# Patient Record
Sex: FEMALE | Race: WHITE | NOT HISPANIC OR LATINO | Employment: OTHER | URBAN - METROPOLITAN AREA
[De-identification: names, ages, dates, MRNs, and addresses within clinical notes are randomized per-mention and may not be internally consistent; named-entity substitution may affect disease eponyms.]

---

## 2024-07-31 ENCOUNTER — HOSPITAL ENCOUNTER (INPATIENT)
Facility: HOSPITAL | Age: 79
LOS: 1 days | Discharge: HOME | End: 2024-08-01
Attending: EMERGENCY MEDICINE | Admitting: STUDENT IN AN ORGANIZED HEALTH CARE EDUCATION/TRAINING PROGRAM
Payer: MEDICARE

## 2024-07-31 ENCOUNTER — APPOINTMENT (OUTPATIENT)
Dept: RADIOLOGY | Facility: HOSPITAL | Age: 79
End: 2024-07-31
Payer: MEDICARE

## 2024-07-31 ENCOUNTER — APPOINTMENT (OUTPATIENT)
Dept: CARDIOLOGY | Facility: HOSPITAL | Age: 79
End: 2024-07-31
Payer: MEDICARE

## 2024-07-31 DIAGNOSIS — R47.01 EXPRESSIVE APHASIA: Primary | ICD-10-CM

## 2024-07-31 DIAGNOSIS — G45.8 OTHER TRANSIENT CEREBRAL ISCHEMIC ATTACKS AND RELATED SYNDROMES: ICD-10-CM

## 2024-07-31 DIAGNOSIS — I67.850 CEREBRAL AUTOSOMAL DOMINANT ARTERIOPATHY WITH SUBCORTICAL INFARCTS AND LEUKOENCEPHALOPATHY: ICD-10-CM

## 2024-07-31 LAB
ALBUMIN SERPL BCP-MCNC: 4.6 G/DL (ref 3.4–5)
ALP SERPL-CCNC: 76 U/L (ref 33–136)
ALT SERPL W P-5'-P-CCNC: 9 U/L (ref 7–45)
ANION GAP SERPL CALC-SCNC: 18 MMOL/L (ref 10–20)
APTT PPP: 30 SECONDS (ref 27–38)
AST SERPL W P-5'-P-CCNC: 15 U/L (ref 9–39)
BASOPHILS # BLD AUTO: 0.04 X10*3/UL (ref 0–0.1)
BASOPHILS NFR BLD AUTO: 0.3 %
BILIRUB SERPL-MCNC: 0.6 MG/DL (ref 0–1.2)
BUN SERPL-MCNC: 16 MG/DL (ref 6–23)
CALCIUM SERPL-MCNC: 9.7 MG/DL (ref 8.6–10.3)
CARDIAC TROPONIN I PNL SERPL HS: 26 NG/L (ref 0–13)
CHLORIDE SERPL-SCNC: 102 MMOL/L (ref 98–107)
CHOLEST SERPL-MCNC: 198 MG/DL (ref 0–199)
CHOLESTEROL/HDL RATIO: 3
CO2 SERPL-SCNC: 22 MMOL/L (ref 21–32)
CREAT SERPL-MCNC: 0.96 MG/DL (ref 0.5–1.05)
EGFRCR SERPLBLD CKD-EPI 2021: 61 ML/MIN/1.73M*2
EOSINOPHIL # BLD AUTO: 0 X10*3/UL (ref 0–0.4)
EOSINOPHIL NFR BLD AUTO: 0 %
ERYTHROCYTE [DISTWIDTH] IN BLOOD BY AUTOMATED COUNT: 13.2 % (ref 11.5–14.5)
GLUCOSE BLD MANUAL STRIP-MCNC: 160 MG/DL (ref 74–99)
GLUCOSE SERPL-MCNC: 149 MG/DL (ref 74–99)
HCT VFR BLD AUTO: 42.1 % (ref 36–46)
HDLC SERPL-MCNC: 66.1 MG/DL
HGB BLD-MCNC: 14 G/DL (ref 12–16)
HOLD SPECIMEN: NORMAL
IMM GRANULOCYTES # BLD AUTO: 0.07 X10*3/UL (ref 0–0.5)
IMM GRANULOCYTES NFR BLD AUTO: 0.5 % (ref 0–0.9)
INR PPP: 1 (ref 0.9–1.1)
LDLC SERPL CALC-MCNC: 114 MG/DL
LYMPHOCYTES # BLD AUTO: 2.08 X10*3/UL (ref 0.8–3)
LYMPHOCYTES NFR BLD AUTO: 14.9 %
MAGNESIUM SERPL-MCNC: 1.49 MG/DL (ref 1.6–2.4)
MCH RBC QN AUTO: 29.4 PG (ref 26–34)
MCHC RBC AUTO-ENTMCNC: 33.3 G/DL (ref 32–36)
MCV RBC AUTO: 88 FL (ref 80–100)
MONOCYTES # BLD AUTO: 0.65 X10*3/UL (ref 0.05–0.8)
MONOCYTES NFR BLD AUTO: 4.6 %
NEUTROPHILS # BLD AUTO: 11.16 X10*3/UL (ref 1.6–5.5)
NEUTROPHILS NFR BLD AUTO: 79.7 %
NON HDL CHOLESTEROL: 132 MG/DL (ref 0–149)
NRBC BLD-RTO: 0 /100 WBCS (ref 0–0)
PLATELET # BLD AUTO: 293 X10*3/UL (ref 150–450)
POCT GLUCOSE: 160 MG/DL (ref 74–99)
POTASSIUM SERPL-SCNC: 3.6 MMOL/L (ref 3.5–5.3)
PROT SERPL-MCNC: 7.3 G/DL (ref 6.4–8.2)
PROTHROMBIN TIME: 11.3 SECONDS (ref 9.8–12.8)
RBC # BLD AUTO: 4.76 X10*6/UL (ref 4–5.2)
SARS-COV-2 RNA RESP QL NAA+PROBE: NOT DETECTED
SODIUM SERPL-SCNC: 138 MMOL/L (ref 136–145)
TRIGL SERPL-MCNC: 91 MG/DL (ref 0–149)
VLDL: 18 MG/DL (ref 0–40)
WBC # BLD AUTO: 14 X10*3/UL (ref 4.4–11.3)

## 2024-07-31 PROCEDURE — 70450 CT HEAD/BRAIN W/O DYE: CPT | Performed by: RADIOLOGY

## 2024-07-31 PROCEDURE — 70551 MRI BRAIN STEM W/O DYE: CPT

## 2024-07-31 PROCEDURE — 84484 ASSAY OF TROPONIN QUANT: CPT | Performed by: EMERGENCY MEDICINE

## 2024-07-31 PROCEDURE — 2500000001 HC RX 250 WO HCPCS SELF ADMINISTERED DRUGS (ALT 637 FOR MEDICARE OP)

## 2024-07-31 PROCEDURE — 2500000002 HC RX 250 W HCPCS SELF ADMINISTERED DRUGS (ALT 637 FOR MEDICARE OP, ALT 636 FOR OP/ED): Performed by: STUDENT IN AN ORGANIZED HEALTH CARE EDUCATION/TRAINING PROGRAM

## 2024-07-31 PROCEDURE — 70551 MRI BRAIN STEM W/O DYE: CPT | Performed by: RADIOLOGY

## 2024-07-31 PROCEDURE — 87635 SARS-COV-2 COVID-19 AMP PRB: CPT | Performed by: STUDENT IN AN ORGANIZED HEALTH CARE EDUCATION/TRAINING PROGRAM

## 2024-07-31 PROCEDURE — 36415 COLL VENOUS BLD VENIPUNCTURE: CPT | Performed by: EMERGENCY MEDICINE

## 2024-07-31 PROCEDURE — 83735 ASSAY OF MAGNESIUM: CPT

## 2024-07-31 PROCEDURE — 85610 PROTHROMBIN TIME: CPT | Performed by: EMERGENCY MEDICINE

## 2024-07-31 PROCEDURE — G0378 HOSPITAL OBSERVATION PER HR: HCPCS

## 2024-07-31 PROCEDURE — 85025 COMPLETE CBC W/AUTO DIFF WBC: CPT | Performed by: EMERGENCY MEDICINE

## 2024-07-31 PROCEDURE — 80061 LIPID PANEL: CPT

## 2024-07-31 PROCEDURE — 1200000002 HC GENERAL ROOM WITH TELEMETRY DAILY

## 2024-07-31 PROCEDURE — 82947 ASSAY GLUCOSE BLOOD QUANT: CPT

## 2024-07-31 PROCEDURE — 2500000002 HC RX 250 W HCPCS SELF ADMINISTERED DRUGS (ALT 637 FOR MEDICARE OP, ALT 636 FOR OP/ED)

## 2024-07-31 PROCEDURE — 70450 CT HEAD/BRAIN W/O DYE: CPT

## 2024-07-31 PROCEDURE — 70498 CT ANGIOGRAPHY NECK: CPT | Performed by: RADIOLOGY

## 2024-07-31 PROCEDURE — 70498 CT ANGIOGRAPHY NECK: CPT

## 2024-07-31 PROCEDURE — 2550000001 HC RX 255 CONTRASTS: Performed by: EMERGENCY MEDICINE

## 2024-07-31 PROCEDURE — 83036 HEMOGLOBIN GLYCOSYLATED A1C: CPT

## 2024-07-31 PROCEDURE — 70496 CT ANGIOGRAPHY HEAD: CPT | Performed by: RADIOLOGY

## 2024-07-31 PROCEDURE — 99291 CRITICAL CARE FIRST HOUR: CPT | Mod: 25 | Performed by: EMERGENCY MEDICINE

## 2024-07-31 PROCEDURE — 2500000004 HC RX 250 GENERAL PHARMACY W/ HCPCS (ALT 636 FOR OP/ED)

## 2024-07-31 PROCEDURE — 93005 ELECTROCARDIOGRAM TRACING: CPT

## 2024-07-31 PROCEDURE — 80053 COMPREHEN METABOLIC PANEL: CPT | Performed by: EMERGENCY MEDICINE

## 2024-07-31 PROCEDURE — 85730 THROMBOPLASTIN TIME PARTIAL: CPT | Performed by: EMERGENCY MEDICINE

## 2024-07-31 PROCEDURE — 99223 1ST HOSP IP/OBS HIGH 75: CPT | Performed by: STUDENT IN AN ORGANIZED HEALTH CARE EDUCATION/TRAINING PROGRAM

## 2024-07-31 PROCEDURE — 96365 THER/PROPH/DIAG IV INF INIT: CPT

## 2024-07-31 PROCEDURE — 96366 THER/PROPH/DIAG IV INF ADDON: CPT

## 2024-07-31 RX ORDER — ASPIRIN 81 MG/1
81 TABLET ORAL DAILY
COMMUNITY

## 2024-07-31 RX ORDER — ASPIRIN 81 MG/1
81 TABLET ORAL DAILY
Status: DISCONTINUED | OUTPATIENT
Start: 2024-07-31 | End: 2024-08-01 | Stop reason: HOSPADM

## 2024-07-31 RX ORDER — DILTIAZEM HYDROCHLORIDE EXTENDED-RELEASE TABLETS 120 MG/1
120 TABLET, EXTENDED RELEASE ORAL DAILY
COMMUNITY

## 2024-07-31 RX ORDER — CLOPIDOGREL BISULFATE 75 MG/1
75 TABLET ORAL DAILY
Status: DISCONTINUED | OUTPATIENT
Start: 2024-08-01 | End: 2024-08-01 | Stop reason: HOSPADM

## 2024-07-31 RX ORDER — POTASSIUM CHLORIDE 1.5 G/1.58G
40 POWDER, FOR SOLUTION ORAL ONCE
Status: COMPLETED | OUTPATIENT
Start: 2024-07-31 | End: 2024-07-31

## 2024-07-31 RX ORDER — HYDRALAZINE HYDROCHLORIDE 25 MG/1
25 TABLET, FILM COATED ORAL EVERY 6 HOURS PRN
Status: DISCONTINUED | OUTPATIENT
Start: 2024-08-02 | End: 2024-07-31

## 2024-07-31 RX ORDER — ROSUVASTATIN CALCIUM 10 MG/1
20 TABLET, COATED ORAL NIGHTLY
Status: DISCONTINUED | OUTPATIENT
Start: 2024-07-31 | End: 2024-08-01 | Stop reason: HOSPADM

## 2024-07-31 RX ORDER — CLOPIDOGREL BISULFATE 300 MG/1
300 TABLET, FILM COATED ORAL ONCE
Status: COMPLETED | OUTPATIENT
Start: 2024-07-31 | End: 2024-07-31

## 2024-07-31 RX ORDER — SERTRALINE HYDROCHLORIDE 25 MG/1
25 TABLET, FILM COATED ORAL DAILY
COMMUNITY

## 2024-07-31 RX ORDER — ENOXAPARIN SODIUM 100 MG/ML
40 INJECTION SUBCUTANEOUS EVERY 24 HOURS
Status: DISCONTINUED | OUTPATIENT
Start: 2024-07-31 | End: 2024-08-01 | Stop reason: HOSPADM

## 2024-07-31 RX ORDER — SERTRALINE HYDROCHLORIDE 25 MG/1
25 TABLET, FILM COATED ORAL DAILY
Status: DISCONTINUED | OUTPATIENT
Start: 2024-07-31 | End: 2024-08-01 | Stop reason: HOSPADM

## 2024-07-31 RX ORDER — PANTOPRAZOLE SODIUM 40 MG/1
40 TABLET, DELAYED RELEASE ORAL
Status: DISCONTINUED | OUTPATIENT
Start: 2024-08-01 | End: 2024-08-01 | Stop reason: HOSPADM

## 2024-07-31 RX ORDER — LABETALOL HYDROCHLORIDE 5 MG/ML
10 INJECTION, SOLUTION INTRAVENOUS EVERY 10 MIN PRN
Status: DISCONTINUED | OUTPATIENT
Start: 2024-07-31 | End: 2024-07-31

## 2024-07-31 RX ORDER — MAGNESIUM SULFATE HEPTAHYDRATE 40 MG/ML
2 INJECTION, SOLUTION INTRAVENOUS ONCE
Status: DISCONTINUED | OUTPATIENT
Start: 2024-07-31 | End: 2024-07-31

## 2024-07-31 RX ORDER — MINERAL OIL
180 ENEMA (ML) RECTAL DAILY
COMMUNITY

## 2024-07-31 RX ORDER — OMEPRAZOLE 40 MG/1
40 CAPSULE, DELAYED RELEASE ORAL DAILY
COMMUNITY

## 2024-07-31 RX ORDER — HYDRALAZINE HYDROCHLORIDE 20 MG/ML
10 INJECTION INTRAMUSCULAR; INTRAVENOUS
Status: DISCONTINUED | OUTPATIENT
Start: 2024-07-31 | End: 2024-07-31

## 2024-07-31 RX ORDER — POTASSIUM CHLORIDE 20 MEQ/1
40 TABLET, EXTENDED RELEASE ORAL ONCE
Status: COMPLETED | OUTPATIENT
Start: 2024-07-31 | End: 2024-07-31

## 2024-07-31 RX ORDER — BUSPIRONE HYDROCHLORIDE 15 MG/1
15 TABLET ORAL 2 TIMES DAILY
COMMUNITY

## 2024-07-31 RX ORDER — MAGNESIUM SULFATE HEPTAHYDRATE 40 MG/ML
4 INJECTION, SOLUTION INTRAVENOUS ONCE
Status: COMPLETED | OUTPATIENT
Start: 2024-07-31 | End: 2024-07-31

## 2024-07-31 RX ORDER — LABETALOL HYDROCHLORIDE 5 MG/ML
10 INJECTION, SOLUTION INTRAVENOUS EVERY 6 HOURS PRN
Status: DISCONTINUED | OUTPATIENT
Start: 2024-07-31 | End: 2024-08-01 | Stop reason: HOSPADM

## 2024-07-31 RX ORDER — NAPROXEN SODIUM 220 MG/1
325 TABLET, FILM COATED ORAL ONCE
Status: DISCONTINUED | OUTPATIENT
Start: 2024-07-31 | End: 2024-08-01 | Stop reason: HOSPADM

## 2024-07-31 RX ORDER — ROSUVASTATIN CALCIUM 5 MG/1
5 TABLET, COATED ORAL DAILY
COMMUNITY
End: 2024-08-01 | Stop reason: HOSPADM

## 2024-07-31 RX ORDER — LORAZEPAM 0.5 MG/1
0.5 TABLET ORAL DAILY PRN
COMMUNITY

## 2024-07-31 RX ORDER — LORAZEPAM 0.5 MG/1
0.25 TABLET ORAL DAILY PRN
Status: DISCONTINUED | OUTPATIENT
Start: 2024-07-31 | End: 2024-08-01 | Stop reason: HOSPADM

## 2024-07-31 RX ORDER — ACETAMINOPHEN 325 MG/1
650 TABLET ORAL EVERY 4 HOURS PRN
Status: DISCONTINUED | OUTPATIENT
Start: 2024-07-31 | End: 2024-08-01 | Stop reason: HOSPADM

## 2024-07-31 RX ORDER — BUTALBITAL, ACETAMINOPHEN AND CAFFEINE 50; 325; 40 MG/1; MG/1; MG/1
1 TABLET ORAL EVERY 4 HOURS PRN
COMMUNITY

## 2024-07-31 RX ORDER — NAPROXEN SODIUM 220 MG/1
325 TABLET, FILM COATED ORAL DAILY
Status: DISCONTINUED | OUTPATIENT
Start: 2024-07-31 | End: 2024-07-31

## 2024-07-31 RX ORDER — CETIRIZINE HYDROCHLORIDE 10 MG/1
10 TABLET ORAL DAILY
Status: DISCONTINUED | OUTPATIENT
Start: 2024-07-31 | End: 2024-08-01 | Stop reason: HOSPADM

## 2024-07-31 SDOH — SOCIAL STABILITY: SOCIAL INSECURITY: DOES ANYONE TRY TO KEEP YOU FROM HAVING/CONTACTING OTHER FRIENDS OR DOING THINGS OUTSIDE YOUR HOME?: NO

## 2024-07-31 SDOH — SOCIAL STABILITY: SOCIAL INSECURITY: HAVE YOU HAD THOUGHTS OF HARMING ANYONE ELSE?: NO

## 2024-07-31 SDOH — SOCIAL STABILITY: SOCIAL INSECURITY: DO YOU FEEL UNSAFE GOING BACK TO THE PLACE WHERE YOU ARE LIVING?: NO

## 2024-07-31 SDOH — SOCIAL STABILITY: SOCIAL INSECURITY: HAVE YOU HAD ANY THOUGHTS OF HARMING ANYONE ELSE?: NO

## 2024-07-31 SDOH — SOCIAL STABILITY: SOCIAL INSECURITY: HAS ANYONE EVER THREATENED TO HURT YOUR FAMILY OR YOUR PETS?: NO

## 2024-07-31 SDOH — SOCIAL STABILITY: SOCIAL INSECURITY: DO YOU FEEL ANYONE HAS EXPLOITED OR TAKEN ADVANTAGE OF YOU FINANCIALLY OR OF YOUR PERSONAL PROPERTY?: NO

## 2024-07-31 SDOH — SOCIAL STABILITY: SOCIAL INSECURITY: ARE YOU OR HAVE YOU BEEN THREATENED OR ABUSED PHYSICALLY, EMOTIONALLY, OR SEXUALLY BY ANYONE?: NO

## 2024-07-31 SDOH — SOCIAL STABILITY: SOCIAL INSECURITY: WERE YOU ABLE TO COMPLETE ALL THE BEHAVIORAL HEALTH SCREENINGS?: YES

## 2024-07-31 SDOH — SOCIAL STABILITY: SOCIAL INSECURITY: ARE THERE ANY APPARENT SIGNS OF INJURIES/BEHAVIORS THAT COULD BE RELATED TO ABUSE/NEGLECT?: NO

## 2024-07-31 SDOH — SOCIAL STABILITY: SOCIAL INSECURITY: ABUSE: ADULT

## 2024-07-31 ASSESSMENT — COGNITIVE AND FUNCTIONAL STATUS - GENERAL
PATIENT BASELINE BEDBOUND: NO
MOBILITY SCORE: 20
MOVING TO AND FROM BED TO CHAIR: A LITTLE
STANDING UP FROM CHAIR USING ARMS: A LITTLE
STANDING UP FROM CHAIR USING ARMS: A LITTLE
DAILY ACTIVITIY SCORE: 22
WALKING IN HOSPITAL ROOM: A LITTLE
MOBILITY SCORE: 20
HELP NEEDED FOR BATHING: A LITTLE
WALKING IN HOSPITAL ROOM: A LITTLE
MOVING TO AND FROM BED TO CHAIR: A LITTLE
CLIMB 3 TO 5 STEPS WITH RAILING: A LITTLE
CLIMB 3 TO 5 STEPS WITH RAILING: A LITTLE
TOILETING: A LITTLE

## 2024-07-31 ASSESSMENT — LIFESTYLE VARIABLES
HOW OFTEN DO YOU HAVE A DRINK CONTAINING ALCOHOL: NEVER
EVER HAD A DRINK FIRST THING IN THE MORNING TO STEADY YOUR NERVES TO GET RID OF A HANGOVER: NO
EVER FELT BAD OR GUILTY ABOUT YOUR DRINKING: NO
TOTAL SCORE: 0
HAVE PEOPLE ANNOYED YOU BY CRITICIZING YOUR DRINKING: NO
HOW OFTEN DO YOU HAVE 6 OR MORE DRINKS ON ONE OCCASION: NEVER
SKIP TO QUESTIONS 9-10: 1
AUDIT-C TOTAL SCORE: 0
SUBSTANCE_ABUSE_PAST_12_MONTHS: NO
PRESCIPTION_ABUSE_PAST_12_MONTHS: NO
HOW MANY STANDARD DRINKS CONTAINING ALCOHOL DO YOU HAVE ON A TYPICAL DAY: PATIENT DOES NOT DRINK
AUDIT-C TOTAL SCORE: 0
HAVE YOU EVER FELT YOU SHOULD CUT DOWN ON YOUR DRINKING: NO

## 2024-07-31 ASSESSMENT — ACTIVITIES OF DAILY LIVING (ADL)
HEARING - LEFT EAR: FUNCTIONAL
BATHING: NEEDS ASSISTANCE
ADEQUATE_TO_COMPLETE_ADL: YES
WALKS IN HOME: NEEDS ASSISTANCE
HEARING - RIGHT EAR: FUNCTIONAL
FEEDING YOURSELF: INDEPENDENT
GROOMING: INDEPENDENT
DRESSING YOURSELF: INDEPENDENT
JUDGMENT_ADEQUATE_SAFELY_COMPLETE_DAILY_ACTIVITIES: YES
LACK_OF_TRANSPORTATION: NO
PATIENT'S MEMORY ADEQUATE TO SAFELY COMPLETE DAILY ACTIVITIES?: YES
TOILETING: NEEDS ASSISTANCE

## 2024-07-31 ASSESSMENT — COLUMBIA-SUICIDE SEVERITY RATING SCALE - C-SSRS
2. HAVE YOU ACTUALLY HAD ANY THOUGHTS OF KILLING YOURSELF?: NO
1. IN THE PAST MONTH, HAVE YOU WISHED YOU WERE DEAD OR WISHED YOU COULD GO TO SLEEP AND NOT WAKE UP?: NO
6. HAVE YOU EVER DONE ANYTHING, STARTED TO DO ANYTHING, OR PREPARED TO DO ANYTHING TO END YOUR LIFE?: NO

## 2024-07-31 ASSESSMENT — PATIENT HEALTH QUESTIONNAIRE - PHQ9
1. LITTLE INTEREST OR PLEASURE IN DOING THINGS: NOT AT ALL
2. FEELING DOWN, DEPRESSED OR HOPELESS: NOT AT ALL
SUM OF ALL RESPONSES TO PHQ9 QUESTIONS 1 & 2: 0

## 2024-07-31 ASSESSMENT — PAIN - FUNCTIONAL ASSESSMENT: PAIN_FUNCTIONAL_ASSESSMENT: 0-10

## 2024-07-31 ASSESSMENT — PAIN SCALES - GENERAL: PAINLEVEL_OUTOF10: 0 - NO PAIN

## 2024-07-31 NOTE — PROGRESS NOTES
PHARMACY STROKE RESPONSE      Patient Name: Josse Montero  MRN: [id number]  Location: [place] [address]    Patient Weight (kg):   Wt Readings from Last 1 Encounters:   07/31/24 71.5 kg (157 lb 10.1 oz)        An acute Brain Attack has been activated, pharmacy participated in multidisciplinary team bedside response for Josse Montero.  Contraindications for fibrinolytic therapy have been reviewed by pharmacy and any issues relating to medication therapy have been discussed directly with the provider(s) caring for this patient.     Pharmacy aided in the bedside response for fibrinolytic therapy. Patient did not receive fibrinolysis.         Thank you for allowing me to take part in the care of this patient.     Shannan Garcia, PharmD  7/31/2024  12:34 PM         References:    Neurological [facility] Stroke Tools   Neurological [facility] IV Thrombolysis Checklist

## 2024-07-31 NOTE — PROGRESS NOTES
Pharmacy Medication History Review    Josse Montero is a 78 y.o. female admitted for No Principal Problem: There is no principal problem currently on the Problem List. Please update the Problem List and refresh.. Pharmacy reviewed the patient's [id number] medications and allergies for accuracy.    The list below reflectives the updated PTA list. Please review each medication in order reconciliation for additional clarification and justification.  Prior to Admission medications    Medication Sig Start Date End Date Authorizing Provider   aspirin 81 mg EC tablet Take 1 tablet (81 mg) by mouth once daily.   Historical Provider, MD   busPIRone (Buspar) 15 mg tablet Take 1 tablet (15 mg) by mouth 2 times a day.   Historical Provider, MD   butalbital-acetaminophen-caff -40 mg tablet Take 1 tablet by mouth every 4 hours if needed for headaches.   Historical Provider, MD   dilTIAZem LA (Cardizem LA) 120 mg 24 hr tablet Take 1 tablet (120 mg) by mouth once daily.   Historical Provider, MD   fexofenadine (Allegra) 180 mg tablet Take 1 tablet (180 mg) by mouth once daily.   Historical Provider, MD   LORazepam (Ativan) 0.5 mg tablet Take 0.5 tablets (0.25 mg) by mouth once daily as needed for anxiety.   Historical Provider, MD   omeprazole (PriLOSEC) 40 mg DR capsule Take 1 capsule (40 mg) by mouth once daily.   Historical Provider, MD   rosuvastatin (Crestor) 5 mg tablet Take 1 tablet (5 mg) by mouth once daily.   Historical Provider, MD   sertraline (Zoloft) 25 mg tablet Take 1 tablet (25 mg) by mouth once daily.   Historical Provider, MD        The list below reflectives the updated allergy list. Please review each documented allergy for additional clarification and justification.  Allergies  Reviewed by Aubrie Light RN on 7/31/2024        Severity Reactions Comments    Celecoxib Low Itching     Doxycycline Low GI Upset     Meperidine Low Nausea/vomiting     Sulfa (sulfonamide Antibiotics) Low Hives              Below are additional concerns with the patient's PTA list.      Yuly Samuels

## 2024-07-31 NOTE — ED TRIAGE NOTES
Pt presents to ED for expressive aphasia that began last night around 2000. Pt has stuttered speech. Reports headache. Denies numbness/tingling, vision changes.

## 2024-07-31 NOTE — ED PROVIDER NOTES
History/Exam limitations: communication barrier aphasia .   Additional history was obtained from patient.    HPI:       Josse Montero is a 78 y.o. with a history of hypertension hyperlipidemia that presents to the emergency department expressive aphasia.  Patient states started last evening right around 9 PM.  She states she began to have a hard time getting her words out.  She waited until today and told her son.  She denies any changes in vision, weakness, trouble swallowing, other systemic complaints.  She has no history of stroke.  She is on no anticoagulants.       Last Known Well Time: 9P yesterday        ------------------------------------------------------------------------------------------------------------------------------------------     Physical Exam:    ED Triage Vitals   Temp Pulse Resp BP   -- -- -- --      SpO2 Temp src Heart Rate Source Patient Position   -- -- -- --      BP Location FiO2 (%)     -- --          Gen: Not in acute distress  Head/Neck: NCAT  Eyes: Anicteric sclerae, noninjected conjunctivae  Nose: No rhinorrhea  Mouth:  MMM  Heart: Regular rate and rhythm w/ no murmurs, rubs, gallops  Lungs: CTAB w/o wheezes, rales, rhonchi, no increased work of breathing  Abdomen: Soft, NT/ND  Musculoskeletal: No deformities  Extremities: No edema.  Neurologic: Please see below for NIHSS  Skin: No rashes noted  Psychological: Calm        Interval: Baseline  Time: 12:21 PM  Person Administering Scale: Anselmo Jimenez MD     1a  Level of consciousness: 0=alert; keenly responsive   1b. LOC questions:  0=Performs both tasks correctly   1c. LOC commands: 0=Performs both tasks correctly   2.  Best Gaze: 0=normal   3. Visual: 0=No visual loss   4. Facial Palsy: 0=Normal symmetric movement   5a. Motor left arm: 0=No drift, limb holds 90 (or 45) degrees for full 10 seconds   5b.  Motor right arm: 0=No drift, limb holds 90 (or 45) degrees for full 10 seconds   6a. motor left le=No drift, limb holds 90  (or 45) degrees for full 10 seconds   6b  Motor right le=No drift, limb holds 90 (or 45) degrees for full 10 seconds   7. Limb Ataxia: 0=Absent   8.  Sensory: 0=Normal; no sensory loss   9. Best Language:  2=Severe Aphasia: fragmentary expression, inference needed, cannot identify materials   10. Dysarthria: 0=Normal   11. Extinction and Inattention: 0=No abnormality     Total:   2         VAN: VAN: Negative     ------------------------------------------------------------------------------------------------------------------------------------------     Medical Decision Making:     ED Course as of 24 1304   Wed 2024   1252 CBC demonstrates a mild leukocytosis. [MK]   1252 CT shows no acute intracranial abnormalities. [MK]   1253 CTA shows no evidence of large vessel occlusion. [MK]      ED Course User Index  [MK] Anselmo Jimenez MD         Diagnoses as of 24 1304   Expressive aphasia     Patient presents emergency department expressive aphasia.  It started at 9 PM.  She is outside the window for TNK.  Given her symptoms, I did obtain CTA even though she was Van negative.  This was negative for large artery occlusion.  At this point, patient will be admitted for further stroke workup.    EKG interpreted by myself: Sinus rhythm.  Rate of 99.  Mildly prolonged QT.  No acute ischemia.  No STEMI.     Independent Interpretation of Studies: I independently interpreted the CT head and see No obvious evidence of intracranial hemorrhage    Chronic Medical Conditions Significantly Affecting Care: Hypertension, hyperlipidemia    Social Determinants of Health Significantly Affecting Care:  None     External Records Reviewed: I reviewed recent and relevant outside records including: Outpatient medication reconciliation     Discussion of Management with Other Providers:   I discussed the patient/results with:  neurology and the admitting team      IV Thrombolysis IV Thrombolysis Checklist        IV  Thrombolysis Given: No; Thrombolysis contraindication reason: Neurologic signs have spontaneously resolved         Procedure  Critical Care    Performed by: Anselmo Jimenez MD  Authorized by: Anselmo Jimenez MD    Critical care provider statement:     Critical care time (minutes):  35    Critical care time was exclusive of:  Separately billable procedures and treating other patients and teaching time    Critical care was necessary to treat or prevent imminent or life-threatening deterioration of the following conditions:  CNS failure or compromise    Critical care was time spent personally by me on the following activities:  Ordering and performing treatments and interventions, ordering and review of laboratory studies, ordering and review of radiographic studies, re-evaluation of patient's condition, review of old charts, examination of patient, discussions with primary provider, discussions with consultants and evaluation of patient's response to treatment    Care discussed with: admitting provider              Anselmo Jimenez MD  07/31/24 [id number]

## 2024-07-31 NOTE — H&P
"  Subjective/History     Josse Montero is a 78 y.o. female with a history of hypertension, hyperlipidemia presented to the emergency department with expressive aphasia and dysarthria.  Patient states that around 9 PM last night she began to have trouble finding words and \"could not speak\".  She did not have any other deficits, no focal weakness no paresthesias no blurry vision, difficulty walking.  She says she went to bed and woke up around 3 in the morning and had 2 episodes of vomiting and went back to sleep but woke up this morning and she was still having difficulty speaking so she decided to come to the hospital.  She says the difficulty speaking is associated with what she describes as a sinus headache, but there are no other associated symptoms.  Family in the room states that she had an irregular heart rhythm in the early 2000's and says she can feel her heart fluttering in her chest a couple times a month but denies any palpitations or similar symptoms last night.  She states that the last time she felt that way was 4 or 5 months ago.  At time of examination patient states that her difficulty speaking has greatly improved and she is only having mild difficulty with occasional word finding.      ED Course: Vitals significant for heart rate of 115, blood pressure 151/93, saturating 92% on room air.  Labs significant for white blood cell count of 14, troponin mildly elevated at 26.  Head CT negative for acute pathology, CTA head and neck negative for occlusion.    Surgical history: As below  Family history: As below  Risk/Social factors: Denies tobacco use    No past medical history on file.    No past surgical history on file.    Family history:family history is not on file.    Objective     Physical Exam  Vitals reviewed.   Constitutional:       General: She is not in acute distress.     Appearance: Normal appearance.   Cardiovascular:      Rate and Rhythm: Normal rate and regular rhythm.   Pulmonary:      " Effort: Pulmonary effort is normal.      Breath sounds: Normal breath sounds.   Abdominal:      General: Abdomen is flat.      Palpations: Abdomen is soft.   Musculoskeletal:      Right lower leg: No edema.      Left lower leg: No edema.   Neurological:      General: No focal deficit present.      Mental Status: She is alert and oriented to person, place, and time.      Sensory: No sensory deficit.      Motor: No weakness.      Comments: Mild aphasia       Last Recorded Vitals  Blood pressure 134/83, pulse 96, temperature 36.2 °C (97.2 °F), temperature source Tympanic, resp. rate 20, weight 71.5 kg (157 lb 10.1 oz), SpO2 96%.  Intake/Output last 3 Shifts:  No intake/output data recorded.    Last CBC & BMP  Lab Results   Component Value Date    GLUCOSE 149 (H) 07/31/2024    CALCIUM 9.7 07/31/2024     07/31/2024    K 3.6 07/31/2024    CO2 22 07/31/2024     07/31/2024    BUN 16 07/31/2024    CREATININE 0.96 07/31/2024     Lab Results   Component Value Date    WBC 14.0 (H) 07/31/2024    HGB 14.0 07/31/2024    HCT 42.1 07/31/2024    MCV 88 07/31/2024     07/31/2024         Assessment / Plan   Josse Montero is a 78 y.o. female with a history of hypertension and hyperlipidemia who presented expressive aphasia and dysarthria.    #Stroke-like symptoms  -Patient presented with severe dysarthria  -EKG showing normal sinus rhythm with prolonged QT  -CT head 7/31 showing no acute intracranial pathology; CTA 7/31 negative for occlusion, mild narrowing of left carotid bulb at the origin of the V1 segment of right vertebral artery  -Echo with bubble study and MRI brain ordered. Increase rosuvastatin to 20 mg, will load with aspirin 325 and Plavix 300 mg followed by DAPT for 21 days then aspirin only thereafter, will need Holter monitor at discharge  -Neurology consulted    #Hypertension  -Home meds include Lopressor 25 mg twice daily, patient also states she was taking Cardizem for blood pressure  -Hold home meds  and allow permissive hypertension for the time being, as needed labetalol for SBP greater than 220 or DBP greater than 120    #HLD  -Lipid panel pending  -Rosuvastatin increased as above    DVT: Lovenox  Diet: Pending speech eval  Consults: Neurology, PT, OT, SLP  Dispo: Telemetry      Silviano Castle MD  PGY-1, Internal Medicine    This is a preliminary note, please await attending attestation for final A/P

## 2024-08-01 ENCOUNTER — APPOINTMENT (OUTPATIENT)
Dept: CARDIOLOGY | Facility: HOSPITAL | Age: 79
End: 2024-08-01
Payer: MEDICARE

## 2024-08-01 VITALS
SYSTOLIC BLOOD PRESSURE: 190 MMHG | WEIGHT: 161.82 LBS | BODY MASS INDEX: 31.77 KG/M2 | HEART RATE: 72 BPM | RESPIRATION RATE: 17 BRPM | DIASTOLIC BLOOD PRESSURE: 81 MMHG | TEMPERATURE: 99 F | OXYGEN SATURATION: 94 % | HEIGHT: 60 IN

## 2024-08-01 PROBLEM — R47.01 EXPRESSIVE APHASIA: Status: RESOLVED | Noted: 2024-07-31 | Resolved: 2024-08-01

## 2024-08-01 LAB
ALBUMIN SERPL BCP-MCNC: 3.9 G/DL (ref 3.4–5)
ANION GAP SERPL CALC-SCNC: 11 MMOL/L (ref 10–20)
AORTIC VALVE MEAN GRADIENT: 6 MMHG
AORTIC VALVE PEAK VELOCITY: 1.65 M/S
ATRIAL RATE: 99 BPM
AV PEAK GRADIENT: 10.9 MMHG
AVA (PEAK VEL): 1.48 CM2
AVA (VTI): 1.51 CM2
BODY SURFACE AREA: 1.76 M2
BUN SERPL-MCNC: 18 MG/DL (ref 6–23)
CALCIUM SERPL-MCNC: 8.8 MG/DL (ref 8.6–10.3)
CHLORIDE SERPL-SCNC: 104 MMOL/L (ref 98–107)
CO2 SERPL-SCNC: 29 MMOL/L (ref 21–32)
CREAT SERPL-MCNC: 0.88 MG/DL (ref 0.5–1.05)
EGFRCR SERPLBLD CKD-EPI 2021: 67 ML/MIN/1.73M*2
EJECTION FRACTION APICAL 4 CHAMBER: 79.4
EJECTION FRACTION: 76 %
ERYTHROCYTE [DISTWIDTH] IN BLOOD BY AUTOMATED COUNT: 13.6 % (ref 11.5–14.5)
EST. AVERAGE GLUCOSE BLD GHB EST-MCNC: 100 MG/DL
GLUCOSE BLD MANUAL STRIP-MCNC: 100 MG/DL (ref 74–99)
GLUCOSE BLD MANUAL STRIP-MCNC: 89 MG/DL (ref 74–99)
GLUCOSE SERPL-MCNC: 81 MG/DL (ref 74–99)
HBA1C MFR BLD: 5.1 %
HCT VFR BLD AUTO: 38.4 % (ref 36–46)
HGB BLD-MCNC: 12.2 G/DL (ref 12–16)
LEFT ATRIUM VOLUME AREA LENGTH INDEX BSA: 27.3 ML/M2
LEFT VENTRICULAR OUTFLOW TRACT DIAMETER: 2 CM
MAGNESIUM SERPL-MCNC: 2.39 MG/DL (ref 1.6–2.4)
MCH RBC QN AUTO: 28.7 PG (ref 26–34)
MCHC RBC AUTO-ENTMCNC: 31.8 G/DL (ref 32–36)
MCV RBC AUTO: 90 FL (ref 80–100)
MITRAL VALVE E/A RATIO: 0.8
NRBC BLD-RTO: 0 /100 WBCS (ref 0–0)
P AXIS: 47 DEGREES
PHOSPHATE SERPL-MCNC: 3.5 MG/DL (ref 2.5–4.9)
PLATELET # BLD AUTO: 234 X10*3/UL (ref 150–450)
POTASSIUM SERPL-SCNC: 4.1 MMOL/L (ref 3.5–5.3)
PR INTERVAL: 203 MS
Q ONSET: 251 MS
QRS COUNT: 16 BEATS
QRS DURATION: 102 MS
QT INTERVAL: 404 MS
QTC CALCULATION(BAZETT): 519 MS
QTC FREDERICIA: 477 MS
R AXIS: -1 DEGREES
RBC # BLD AUTO: 4.25 X10*6/UL (ref 4–5.2)
RIGHT VENTRICLE FREE WALL PEAK S': 13.4 CM/S
SODIUM SERPL-SCNC: 140 MMOL/L (ref 136–145)
T AXIS: 25 DEGREES
T OFFSET: 453 MS
VENTRICULAR RATE: 99 BPM
WBC # BLD AUTO: 10.4 X10*3/UL (ref 4.4–11.3)

## 2024-08-01 PROCEDURE — 94760 N-INVAS EAR/PLS OXIMETRY 1: CPT

## 2024-08-01 PROCEDURE — 36415 COLL VENOUS BLD VENIPUNCTURE: CPT

## 2024-08-01 PROCEDURE — 93306 TTE W/DOPPLER COMPLETE: CPT | Performed by: INTERNAL MEDICINE

## 2024-08-01 PROCEDURE — 2500000001 HC RX 250 WO HCPCS SELF ADMINISTERED DRUGS (ALT 637 FOR MEDICARE OP)

## 2024-08-01 PROCEDURE — 99222 1ST HOSP IP/OBS MODERATE 55: CPT | Performed by: STUDENT IN AN ORGANIZED HEALTH CARE EDUCATION/TRAINING PROGRAM

## 2024-08-01 PROCEDURE — 97165 OT EVAL LOW COMPLEX 30 MIN: CPT | Mod: GO

## 2024-08-01 PROCEDURE — G0378 HOSPITAL OBSERVATION PER HR: HCPCS

## 2024-08-01 PROCEDURE — 82947 ASSAY GLUCOSE BLOOD QUANT: CPT

## 2024-08-01 PROCEDURE — 85027 COMPLETE CBC AUTOMATED: CPT

## 2024-08-01 PROCEDURE — 93270 REMOTE 30 DAY ECG REV/REPORT: CPT

## 2024-08-01 PROCEDURE — 99239 HOSP IP/OBS DSCHRG MGMT >30: CPT | Performed by: STUDENT IN AN ORGANIZED HEALTH CARE EDUCATION/TRAINING PROGRAM

## 2024-08-01 PROCEDURE — 2500000001 HC RX 250 WO HCPCS SELF ADMINISTERED DRUGS (ALT 637 FOR MEDICARE OP): Performed by: STUDENT IN AN ORGANIZED HEALTH CARE EDUCATION/TRAINING PROGRAM

## 2024-08-01 PROCEDURE — 83735 ASSAY OF MAGNESIUM: CPT

## 2024-08-01 PROCEDURE — 93306 TTE W/DOPPLER COMPLETE: CPT

## 2024-08-01 PROCEDURE — 80069 RENAL FUNCTION PANEL: CPT

## 2024-08-01 PROCEDURE — 2500000002 HC RX 250 W HCPCS SELF ADMINISTERED DRUGS (ALT 637 FOR MEDICARE OP, ALT 636 FOR OP/ED)

## 2024-08-01 RX ORDER — ROSUVASTATIN CALCIUM 20 MG/1
20 TABLET, COATED ORAL NIGHTLY
Qty: 30 TABLET | Refills: 1 | Status: SHIPPED | OUTPATIENT
Start: 2024-08-01 | End: 2024-09-30

## 2024-08-01 RX ORDER — CLOPIDOGREL BISULFATE 75 MG/1
75 TABLET ORAL DAILY
Qty: 19 TABLET | Refills: 0 | Status: SHIPPED | OUTPATIENT
Start: 2024-08-02 | End: 2024-08-21

## 2024-08-01 ASSESSMENT — COGNITIVE AND FUNCTIONAL STATUS - GENERAL
MOBILITY SCORE: 23
DAILY ACTIVITIY SCORE: 24
CLIMB 3 TO 5 STEPS WITH RAILING: A LITTLE

## 2024-08-01 ASSESSMENT — PAIN DESCRIPTION - ORIENTATION: ORIENTATION: MID

## 2024-08-01 ASSESSMENT — PAIN SCALES - WONG BAKER: WONGBAKER_NUMERICALRESPONSE: HURTS LITTLE BIT

## 2024-08-01 ASSESSMENT — PAIN DESCRIPTION - LOCATION: LOCATION: HEAD

## 2024-08-01 ASSESSMENT — PAIN SCALES - GENERAL
PAINLEVEL_OUTOF10: 0 - NO PAIN
PAINLEVEL_OUTOF10: 0 - NO PAIN
PAINLEVEL_OUTOF10: 3

## 2024-08-01 NOTE — CARE PLAN
The patient's goals for the shift include      The clinical goals for the shift include To remain hemodynamically stable and to be free of further neuro deficits.    Over the shift, the patient did not make progress toward the following goals. Barriers to progression include n/a. Recommendations to address these barriers include n/a.

## 2024-08-01 NOTE — NURSING NOTE
08/01/24 2834 Patient Navigator  I introduced myself to the patient and explained my role. Pt states she lives with her son and 2 grandchildren. She is very active with them and walks frequently. I did inform her of the recommendations of 30 minutes 3 times a week af active exercise. Pt states she eats a healthy diet. I reviewed the Stroke Folder, the handouts listed below, and answered her questions. I reviewed the following lab values and what they indicate: cholesterol, HDL, LDL, & triglycerides. She denied any other needs or questions and appreciated the information I provided. I gave her my business card & instructed her to call me as needed. I have updated the patient's RN, Aleksey, of my visit. Please see the Education tab for additional information.     Handouts;   Stroke Folder  What can I eat? American Diabetes Association  Lifestyle changes to prevent a stroke- American Stroke Association  How do I follow a healthy diet pattern? American Heart Association    Rowan PICKERING, RN  Patient Navigator  Stroke Educator  Diabetes Care &

## 2024-08-01 NOTE — PROGRESS NOTES
Occupational Therapy    Evaluation    Patient Name: Josse Montero  MRN: [id number]  Today's Date: 8/1/2024  Time Calculation  Start Time: 1118  Stop Time: 1133  Time Calculation (min): 15 min  624/624-A    Assessment  IP OT Assessment  OT Assessment: This hospitalization due to expressive aphasia. Discharge patient from OT since at baseline function in ADL's and functional transfers/mobility:  indep.  End of Session Communication: Bedside nurse  End of Session Patient Position: Bed, 2 rail up; call-light within reach    Plan:  No Skilled OT: At baseline function  OT Discharge Recommendations: No further acute OT, No OT needed after discharge  OT - OK to Discharge: Yes (to next level of care when medically cleared by physician/medical team)    Subjective   Current Problem:  1. Expressive aphasia  Transthoracic Echo (TTE) Complete    Transthoracic Echo (TTE) Complete    Holter Or Event Cardiac Monitor    Holter Or Event Cardiac Monitor    CANCELED: Transthoracic Echo (TTE) Complete    CANCELED: Transthoracic Echo (TTE) Complete      2. 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  Holter Or Event Cardiac Monitor    Holter Or Event Cardiac Monitor        General:  General  Reason for Referral: stroke  Referred By: Anselmo Jimenez MD  Past Medical History Relevant to Rehab: hypertension, hyperlipidemia  Co-Treatment: PT (co-eval)  Prior to Session Communication: Bedside nurse who confirmed that patient is medically stable to participate in this OT session  Patient Position Received: Bed, 2 rail up, Alarm off, not on at start of session  General Comment: Patient seen bedside; cooperative, motivated    Pain:  Pain Assessment  0-10 (Numeric) Pain Score: 0 - No pain    Objective   Cognition:  Overall Cognitive Status: Within Functional Limits  Orientation Level: Oriented X4     Home Living:  Type of Home: House  Lives With:  (son, daughter-in-law, 2 grandchildren:  ages 12,  14))  Home Adaptive Equipment: Cane  Home Layout: Bed/bath upstairs, Two level  Home Access: Stairs to enter without rails (3)  Bathroom Shower/Tub: Tub/shower unit  Bathroom Toilet: Standard  Bathroom Equipment: Tub transfer bench  Home Living Comments: sleeps in regular bed     Prior Function:  Level of [place]: Independent with ADLs and functional transfers (indep cleaning)  Homemaking Assistance:  (shares cooking)  Hand Dominance: Right  Prior Function Comments: drives; groceries delivered    Current ADL:  LE Dressing Assistance: Independent  Toileting Assistance with Device: Independent  ADL Comments: at baseline function for ADL's    Bed Mobility/Transfers:   Bed Mobility  Bed Mobility: Yes  Bed Mobility 1  Bed Mobility 1: Supine to sitting, Sitting to supine  Level of Assistance 1: Independent  Bed Mobility Comments 1: HOB elevated  Transfers  Transfer: Yes  Transfer 1  Transfer From 1: Sit to, Stand to  Transfer to 1: Bed  Transfer Level of Assistance 1: Independent    Ambulation/Gait Training:  Functional Mobility  Functional Mobility Performed: Yes  Functional Mobility 1  Device 1: Single point cane  Assistance 1: Independent    Sitting Balance:  Static Sitting Balance  Static Sitting-Level of Assistance: Independent    Standing Balance:  Static Standing Balance  Static Standing-Level of Assistance: Independent    Sensation:  Light Touch: No apparent deficits    Extremities: RUE   RUE : Within Functional Limits and LUE   LUE: Within Functional Limits    Outcome Measures: [facility] Daily Activity  Putting on and taking off regular lower body clothing: None  Bathing (including washing, rinsing, drying): None  Putting on and taking off regular upper body clothing: None  Toileting, which includes using toilet, bedpan or urinal: None  Taking care of personal grooming such as brushing teeth: None  Eating Meals: None  Daily Activity - Total Score: 24

## 2024-08-01 NOTE — PROGRESS NOTES
Physical Therapy    Physical Therapy Evaluation    Patient Name: Josse Montero  MRN: [id number]  Today's Date: 8/1/2024   Time Calculation  Start Time: 1117  Stop Time: 1132  Time Calculation (min): 15 min  624/624-A    Assessment/Plan   PT Assessment  End of Session Communication: Bedside nurse    Assessment Comment: pt tolerated session. Pt did not require assistance with tasks. pt does not need acute PT at this time.    End of Session Patient Position: Bed, 2 rail up  IP OR SWING BED PT PLAN  Inpatient or Swing Bed: Inpatient  PT Plan  Treatment/Interventions: Bed mobility, Transfer training, Gait training  PT Plan: PT Eval only  PT Eval Only Reason: No acute PT needs identified  PT Frequency: PT eval only  PT Discharge Recommendations: No further acute PT  PT - OK to Discharge: Yes (once cleared by medical team)    Subjective     Current Problem:  1. Expressive aphasia  Transthoracic Echo (TTE) Complete    Transthoracic Echo (TTE) Complete    Holter Or Event Cardiac Monitor    Holter Or Event Cardiac Monitor    CANCELED: Transthoracic Echo (TTE) Complete    CANCELED: Transthoracic Echo (TTE) Complete      2. 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  Holter Or Event Cardiac Monitor    Holter Or Event Cardiac Monitor        Patient Active Problem List   Diagnosis   (none) - all problems resolved or deleted       General Visit Information:  Reason for Referral: expressive aphasia  Referred By: Anselmo Jimenez MD  Past Medical History Relevant to Rehab: HTN, HLD  Co-Treatment: OT  Co-Treatment Reason: to maximize pt safety and mobility  Prior to Session Communication: Bedside nurse  Patient Position Received: Bed, 2 rail up    Home Living: lives with son, daughter-in-law, and 2 granddaughter's; 2 story home, 3 LORY, bed/bathroom upstairs with rail; standard toliet; step over tub and chair; owns SPC.    Prior Level of Function:  Prior Function Per Pt/Caregiver  Report  Level of [place]: Independent with ADLs and functional transfers, Independent with homemaking with ambulation  Hand Dominance: Right  Prior Function Comments: pt drives and groceries are delivered    Precautions: fall, aspiration     Objective     Pain: 0 - No pain    Cognition: Within Functional Limits; Orientation Level: Oriented X4    General Assessments:        Sensation Light Touch: No apparent deficits    Strength Comments: LE strength WFL; LE ROM WFL        Functional Assessments:       Bed Mobility:  (IND sup <> sit ; HOB elevated OOB)    Transfer:  (IND STS from EOB)    Ambulation/Gait Training Performed:  (amb 100ft with w/w CGA)        Outcome Measures:     [facility] Basic Mobility  Turning from your back to your side while in a flat bed without using bedrails: None  Moving from lying on your back to sitting on the side of a flat bed without using bedrails: None  Moving to and from bed to chair (including a wheelchair): None  Standing up from a chair using your arms (e.g. wheelchair or bedside chair): None  To walk in hospital room: None  Climbing 3-5 steps with railing: A little  Basic Mobility - Total Score: 23          Goals:  Encounter Problems       Encounter Problems (Active)       Pain - Adult            Education Documentation  Mobility Training, taught by Mayra Romo PT at 8/1/2024  1:34 PM.  Learner: Patient  Readiness: Acceptance  Method: Explanation  Response: Verbalizes Understanding

## 2024-08-01 NOTE — DISCHARGE SUMMARY
Discharge Diagnosis  Expressive aphasia  TIA  Hypertension  Anxiety  Depression  HLD  Issues Requiring Follow-Up    Discharge Meds     Your medication list        ASK your doctor about these medications        Instructions Last Dose Given Next Dose Due   aspirin 81 mg EC tablet           busPIRone 15 mg tablet  Commonly known as: Buspar           butalbital-acetaminophen-caff -40 mg tablet           dilTIAZem  mg 24 hr tablet  Commonly known as: Cardizem LA           fexofenadine 180 mg tablet  Commonly known as: Allegra           LORazepam 0.5 mg tablet  Commonly known as: Ativan           omeprazole 40 mg DR capsule  Commonly known as: PriLOSEC           rosuvastatin 5 mg tablet  Commonly known as: Crestor           sertraline 25 mg tablet  Commonly known as: Zoloft                  Test Results Pending At Discharge  Pending Labs       No current pending labs.          Hospital Course   70-year-old female with a history of hypertension, hyperlipidemia, anxiety/depression presented 7/31 with expressive aphasia since 9 PM the previous night.  Patient stated that she could not speak, went to sleep and woke up in the middle of the night and had 2 episodes of vomiting, woke up that morning and was having continued difficulty speaking.  No other focal deficits were noted.  In the ED her NIH stroke scale score was 3 for severe dysarthria.  Head CT was negative for acute pathology, CTA head and neck were negative for acute occlusion, brain MRI was negative for acute infarction.  The patient was loaded with aspirin and Plavix to be continued for 3 weeks and aspirin only thereafter.  Her symptoms resolved while in the emergency department and at this time patient feels completely back to normal and is speaking without difficulty. She was seen by Neurology who recommended 3 weeks of dual antiplatelet therapy, high intensity statin and outpatient follow-up in 2 months.  She was discharged with a Zio patch for  monitoring of her heart rhythm and prescriptions for 20 mg rosuvastatin and Plavix.    Pertinent Physical Exam At Time of Discharge  Physical Exam  Vitals reviewed.   Constitutional:       Appearance: Normal appearance.   Cardiovascular:      Rate and Rhythm: Normal rate and regular rhythm.   Pulmonary:      Effort: Pulmonary effort is normal.      Breath sounds: Normal breath sounds.   Abdominal:      General: Abdomen is flat.      Palpations: Abdomen is soft.   Musculoskeletal:      Right lower leg: No edema.      Left lower leg: No edema.   Skin:     General: Skin is warm and dry.   Neurological:      General: No focal deficit present.      Mental Status: She is alert and oriented to person, place, and time.      Sensory: No sensory deficit.      Motor: No weakness.       Outpatient Follow-Up  No future appointments.      Silviano Castle MD

## 2024-08-01 NOTE — DISCHARGE INSTRUCTIONS
1.  You will be discharged on aspirin and another medication called Plavix.  These are to help prevent future strokes.  Take both aspirin and Plavix for a total of 3 weeks, then stop taking Plavix and take only aspirin daily.    2.  Make sure to follow-up with your PCP within 2 weeks of discharge so they may adjust any medications, as well as neurology in 2 months.

## 2024-08-01 NOTE — CONSULTS
Inpatient consult to Neurology  Consult performed by: Nilsa Pena DO  Consult ordered by: Anselmo Jimenez MD      Reason For Consult  Expressive aphasia    History Of Present Illness  Josse Montero is a 78 y.o. female presenting to [facility] on 7/31 for expressive aphasia/dysarthria that began around 9 PM night prior.  Went to bed, woke up at 3 AM w/ 2 vomiting episodes.  Not on blood thinners.  Outside TNK window.  Endorses headaches.  Denies blurry vision, focal weakness, paresthesias, ataxia. Denies palpitations.  Patient never had stroke/similar symptoms before.  Patient uses walker to ambulate.  Lives at home with son.  On arrival .  EKG normal sinus, prolonged Qtc. CT head, CTA head/neck unremarkable aside from mild narrowing of left carotid bulb and origin of V1 segment RVA.  Upon my examination patient speaking clearly/coherently with complete symptom resolution.  She does mention having palpitations/arrhythmia in the past is unsure if it was atrial fibrillation.    Past Medical History  #HTN/HLD-Hold home Lopressor, Cardizem  #GERD-Home Protonix  #Depression/anxiety-Home sertraline, lorazepam, Buspar  #Osteoarthritis-s/p R hip arthroplasty    Surgical History-R hip arthroplasty, hysterectomy TKA, appendectomy, cholecystectomy, tonsillectomy  Social History-denies tobacco  Family History  Family History   Problem Relation Name Age of Onset    Cancer Mother     Allergies-Celecoxib, Doxycycline, Meperidine, and Sulfa (sulfonamide antibiotics)     Physical Exam  General:  Pleasant and cooperative. No apparent distress.  HEENT:  Normocephalic, atraumatic  Chest:  Clear to auscultation. Bilateral and appropriate chest rise  CV:  Regular rate and rhythm.    Abdomen: Abdomen is soft, non-tender, non-distended. BS +   Extremities:  No lower extremity edema or cyanosis.   Neurological:  Conversing and answering questions appropriately   Skin:  Warm and dry.    Last Recorded Vitals  BP (!) 199/78   Pulse 65    Temp 37.3 °C (99.1 °F)   Resp 18   Wt 73.4 kg (161 lb 13.1 oz)   SpO2 94%     Assessment/Plan     #TIA  -Complete resolution of dysarthria. CT head, CTA head/neck unremarkable aside from mild narrowing of left carotid bulb and origin of V1 segment RVA. MRI brain no infarction however 2 cm left convexity meningioma, can consider outpatient follow-up for surgical intervention if symptomatic/grossly worsening symptoms. TTE w/ bubble study pending.  , A1c normal.  Hold BP meds allow permissive HTN (<220/120).  DAPTx21 days then aspirin monotherapy.  Home rosuvastatin dose increased.  Neurochecks.  Holter monitor on discharge.  PT/OT.  Follow up with Dr. Levy in a few months.    #HTN/HLD-Hold home Lopressor, Cardizem  #GERD-Home Protonix  #Depression/anxiety-Home sertraline, lorazepam, Buspar  #Osteoarthritis-s/p R hip arthroplasty    Inpatient Checklist  Code Status: DNR  DVT prophylaxis: Lovenox  Lines/Drains/Tubes: PIV  Diet: N.p.o. pending SLP  Disposition: Telemetry    Nilsa Pena, DO  PGY-2, Internal Medicine  Please SecureChat for any further questions  This is a preliminary note, please await attending attestation for final A/P

## 2024-09-06 LAB — BODY SURFACE AREA: 1.76 M2

## 2024-10-09 ENCOUNTER — APPOINTMENT (OUTPATIENT)
Dept: NEUROLOGY | Facility: CLINIC | Age: 79
End: 2024-10-09
Payer: COMMERCIAL

## 2024-10-09 VITALS
WEIGHT: 156.4 LBS | HEIGHT: 59 IN | TEMPERATURE: 97.8 F | HEART RATE: 68 BPM | BODY MASS INDEX: 31.53 KG/M2 | SYSTOLIC BLOOD PRESSURE: 138 MMHG | DIASTOLIC BLOOD PRESSURE: 84 MMHG | RESPIRATION RATE: 18 BRPM

## 2024-10-09 DIAGNOSIS — G43.109 MIGRAINE WITH AURA AND WITHOUT STATUS MIGRAINOSUS, NOT INTRACTABLE: ICD-10-CM

## 2024-10-09 DIAGNOSIS — G45.9 TIA (TRANSIENT ISCHEMIC ATTACK): ICD-10-CM

## 2024-10-09 DIAGNOSIS — D32.9 MENINGIOMA (MULTI): Primary | ICD-10-CM

## 2024-10-09 PROCEDURE — 1157F ADVNC CARE PLAN IN RCRD: CPT | Performed by: PSYCHIATRY & NEUROLOGY

## 2024-10-09 PROCEDURE — 99214 OFFICE O/P EST MOD 30 MIN: CPT | Performed by: PSYCHIATRY & NEUROLOGY

## 2024-10-09 PROCEDURE — 1160F RVW MEDS BY RX/DR IN RCRD: CPT | Performed by: PSYCHIATRY & NEUROLOGY

## 2024-10-09 PROCEDURE — 1036F TOBACCO NON-USER: CPT | Performed by: PSYCHIATRY & NEUROLOGY

## 2024-10-09 PROCEDURE — 1159F MED LIST DOCD IN RCRD: CPT | Performed by: PSYCHIATRY & NEUROLOGY

## 2024-10-09 PROCEDURE — 1126F AMNT PAIN NOTED NONE PRSNT: CPT | Performed by: PSYCHIATRY & NEUROLOGY

## 2024-10-09 RX ORDER — CLOPIDOGREL BISULFATE 75 MG/1
75 TABLET ORAL DAILY
COMMUNITY
Start: 2024-09-16

## 2024-10-09 RX ORDER — PANTOPRAZOLE SODIUM 20 MG/1
20 TABLET, DELAYED RELEASE ORAL
COMMUNITY
Start: 2024-09-05

## 2024-10-09 ASSESSMENT — PATIENT HEALTH QUESTIONNAIRE - PHQ9
2. FEELING DOWN, DEPRESSED OR HOPELESS: NOT AT ALL
SUM OF ALL RESPONSES TO PHQ9 QUESTIONS 1 AND 2: 0
1. LITTLE INTEREST OR PLEASURE IN DOING THINGS: NOT AT ALL

## 2024-10-09 ASSESSMENT — PAIN SCALES - GENERAL: PAINLEVEL: 0-NO PAIN

## 2024-10-09 NOTE — PATIENT INSTRUCTIONS
"It was a pleasure seeing you today.     STOP Aspirin 81mg daily and continue Plavix.   Trial Nurtec.   For future appointments I recommend you schedule a follow-up appointment with general neurology or headache expert.     There are good alternative medication and over-the-counter strategies to help migraines:     You can try riboflavin (vitamin B2) 200 mg twice a day as a natural migraine preventive. This may take several weeks to become fully effective.  You can try magnesium 400 mg once daily as a natural migraine preventive. If it causes loose bowel movements, reduce the dose to 200 mg.    - Melatonin 3-5 mg take 2 hours before bedtime can be helpful for difficulty with sleeping and for headaches, especially cluster headaches but also migraines.  - Avoid triggers that can cause or worsen migraines (food, lack of sleep, stress)  - Keep a diary of your headaches to note how often you get them, how long they last and any other helpful information  - Avoid taking medication for treatment of headaches (NOT preventative medication) more than 3 days per week. This includes both prescription medication and over the counter medication.  - Take your preventative medication as directed. Let me know if you have side effects or problems with the medication. Do not suddenly stop taking the medication.     Please make a follow up appointment on year.    For any urgent issues or needing to speak to a medical assistant please call [phone], option 6 during our office hours Monday-Friday 8am-4pm, and leave a voicemail with your concern.  My office will try to reach back you as soon as possible within 24 (business) hours.  If you have an emergency please call 911 or visit a local urgent care or nearest emergency room.      Please understand that [email or web address] is a useful communication tool for simple \"normal\" results or a refill request but I would not recommend using this tool for emergent or urgent issues or for conversations with " me.  I am happy to ask my staff to rearrange a follow up visit or a virtual visit sooner than requested if appropriate for your care.

## 2024-10-09 NOTE — PROGRESS NOTES
Subjective      Josse Montero is a 79 y.o. year old female is here for hospital follow-up.  Arrives with son today.    HPI  Patient is currently on Crestor as well as aspirin 81 mg daily.  I saw patient on August 1 who presented with expressive aphasia and dysarthria from the night prior.  She had a CT head and CTA head and neck done in the ER.  She does get intermittent headaches.  Her MRI brain shows a 2 cm left frontal meningioma.  Her LDL is 115.  Her Crestor was increased.  Her NIH stroke scale is 0 at time of exam.  The episode lasted a few hours.  She had a migraine that day and vomited twice daily.  Heart monitor was normal.  She is doing well.  She has had hx of migraines for a long time.  She got about 8 migraines since August.   She has no aura.   Sees Dr. Michael    Review of Systems    Patient Active Problem List   Diagnosis   (none) - all problems resolved or deleted     Past Medical History:   Diagnosis Date    Hypertension      Past Surgical History:   Procedure Laterality Date    JOINT REPLACEMENT       Social History     Tobacco Use    Smoking status: Never    Smokeless tobacco: Never   Substance Use Topics    Alcohol use: Never     family history includes Cancer in her mother.    Current Outpatient Medications:     aspirin 81 mg EC tablet, Take 1 tablet (81 mg) by mouth once daily., Disp: , Rfl:     busPIRone (Buspar) 15 mg tablet, Take 1 tablet (15 mg) by mouth 2 times a day., Disp: , Rfl:     butalbital-acetaminophen-caff -40 mg tablet, Take 1 tablet by mouth every 4 hours if needed for headaches., Disp: , Rfl:     dilTIAZem LA (Cardizem LA) 120 mg 24 hr tablet, Take 1 tablet (120 mg) by mouth once daily., Disp: , Rfl:     fexofenadine (Allegra) 180 mg tablet, Take 1 tablet (180 mg) by mouth once daily., Disp: , Rfl:     LORazepam (Ativan) 0.5 mg tablet, Take 0.5 tablets (0.25 mg) by mouth once daily as needed for anxiety., Disp: , Rfl:     omeprazole (PriLOSEC) 40 mg DR capsule, Take 1  capsule (40 mg) by mouth once daily., Disp: , Rfl:     rosuvastatin (Crestor) 20 mg tablet, Take 1 tablet (20 mg) by mouth once daily at bedtime., Disp: 30 tablet, Rfl: 1    sertraline (Zoloft) 25 mg tablet, Take 1 tablet (25 mg) by mouth once daily., Disp: , Rfl:   Allergies   Allergen Reactions    Celecoxib Itching    Doxycycline GI Upset    Meperidine Nausea/vomiting    Sulfa (Sulfonamide Antibiotics) Hives     There were no vitals taken for this visit.  Neurological Exam/Physical Exam:    Constitutional: General appearance: no acute distress. Pleasant.   Auscultation of Heart: Regular rate and rhythm, no murmurs, normal S1 and S2.   Carotid Arteries: no bruits  Peripheral Vascular Exam: No swelling, edema or tenderness to palpation in extremities  Mental status: no distress, alert, interactive and cooperative. Affect is appropriate.   Orientation: oriented to person, oriented to place and oriented to time.   Memory: recent memory intact and remote memory intact.   Attention: normal attention /concentration.   Language: normal comprehension and naming.   Fund of knowledge: Patient displays adequate knowledge of current events.  Eyes: The ophthalmoscopic examination was normal.   Cranial nerve II: Visual fields full to confrontation.   Cranial nerves III, IV, and VI: Pupils round, equally reactive to light; EOMs intact. No nystagmus.   Cranial Nerve V: Facial sensation intact to LT bilaterally.   Cranial nerve VII: no facial droop  Cranial nerve VIII: Hearing is intact  Cranial nerves IX and X: Palate elevates symmetrically.   Cranial nerve XI: Shoulder shrug intact.   Cranial nerve XII: Tongue is midline.  Motor:  Muscle bulk was normal in both upper and lower extremities.    No atrophy.   Strength is normal.   Deep Tendon Reflexes: left biceps 2+ , right biceps 2+, left triceps 2+, right triceps 2+, left brachioradialis 2+, right brachioradialis 2+, left patella 2+, right patella 2+, left ankle jerk 2+, right  ankle jerk 2+   Plantar Reflex: Toes downgoing to plantar stimulation on the left. Toes downgoing to plantar stimulation on the right.   Sensory Exam: Normal to vibratory sensation  Coordination:  no limb dystaxia  Gait:  cautious., uses a cane.        Labs:  CBC:   Lab Results   Component Value Date    WBC 10.4 08/01/2024    HGB 12.2 08/01/2024    HCT 38.4 08/01/2024     08/01/2024     BMP:   Lab Results   Component Value Date     08/01/2024    K 4.1 08/01/2024     08/01/2024    CO2 29 08/01/2024    BUN 18 08/01/2024    CREATININE 0.88 08/01/2024    CALCIUM 8.8 08/01/2024    MG 2.39 08/01/2024    PHOS 3.5 08/01/2024     LFT:   Lab Results   Component Value Date    ALKPHOS 76 07/31/2024    BILITOT 0.6 07/31/2024    PROT 7.3 07/31/2024    ALBUMIN 3.9 08/01/2024    ALT 9 07/31/2024    AST 15 07/31/2024         Assessment/Plan   Problem List Items Addressed This Visit    None  Visit Diagnoses         Codes    Meningioma (Multi)    -  Primary D32.9    TIA (transient ischemic attack)     G45.9        Will plan to repeat MRI brain in 1 year unless new symptoms to evaluate size of meningioma.  Continue with Plavix and stop ASA 81mg and cholesterol medication.  Avoid triptans due to risk of stroke.

## 2024-10-10 DIAGNOSIS — G43.109 MIGRAINE WITH AURA AND WITHOUT STATUS MIGRAINOSUS, NOT INTRACTABLE: Primary | ICD-10-CM

## 2024-12-11 ENCOUNTER — ANCILLARY PROCEDURE (OUTPATIENT)
Dept: URGENT CARE | Age: 79
End: 2024-12-11
Payer: COMMERCIAL

## 2024-12-11 ENCOUNTER — OFFICE VISIT (OUTPATIENT)
Dept: URGENT CARE | Age: 79
End: 2024-12-11
Payer: COMMERCIAL

## 2024-12-11 VITALS
DIASTOLIC BLOOD PRESSURE: 84 MMHG | OXYGEN SATURATION: 98 % | RESPIRATION RATE: 16 BRPM | SYSTOLIC BLOOD PRESSURE: 164 MMHG | BODY MASS INDEX: 30.84 KG/M2 | HEART RATE: 104 BPM | WEIGHT: 153 LBS | HEIGHT: 59 IN | TEMPERATURE: 98.1 F

## 2024-12-11 DIAGNOSIS — R06.2 WHEEZING: Primary | ICD-10-CM

## 2024-12-11 DIAGNOSIS — K44.9 HIATUS HERNIA SYNDROME: ICD-10-CM

## 2024-12-11 DIAGNOSIS — R06.2 WHEEZING: ICD-10-CM

## 2024-12-11 DIAGNOSIS — J22 LOWER RESPIRATORY TRACT INFECTION: ICD-10-CM

## 2024-12-11 PROCEDURE — 99213 OFFICE O/P EST LOW 20 MIN: CPT

## 2024-12-11 PROCEDURE — 1159F MED LIST DOCD IN RCRD: CPT

## 2024-12-11 PROCEDURE — 94640 AIRWAY INHALATION TREATMENT: CPT

## 2024-12-11 PROCEDURE — 71046 X-RAY EXAM CHEST 2 VIEWS: CPT

## 2024-12-11 PROCEDURE — 1157F ADVNC CARE PLAN IN RCRD: CPT

## 2024-12-11 PROCEDURE — 1036F TOBACCO NON-USER: CPT

## 2024-12-11 RX ORDER — AZITHROMYCIN 250 MG/1
TABLET, FILM COATED ORAL
Qty: 6 TABLET | Refills: 0 | Status: SHIPPED | OUTPATIENT
Start: 2024-12-11

## 2024-12-11 RX ORDER — METHYLPREDNISOLONE 4 MG/1
TABLET ORAL
Qty: 21 TABLET | Refills: 0 | Status: SHIPPED | OUTPATIENT
Start: 2024-12-11 | End: 2024-12-17

## 2024-12-11 RX ORDER — IPRATROPIUM BROMIDE AND ALBUTEROL SULFATE 2.5; .5 MG/3ML; MG/3ML
3 SOLUTION RESPIRATORY (INHALATION) ONCE
Status: COMPLETED | OUTPATIENT
Start: 2024-12-11 | End: 2024-12-11

## 2024-12-11 NOTE — PROGRESS NOTES
"Subjective   History of Present Illness: Josse Montero is a 79 y.o. female. They present today with a chief complaint of Cough and Nasal Congestion (Along with green mucus x 3 weeks).        Past Medical History  Allergies as of 12/11/2024 - Reviewed 12/11/2024   Allergen Reaction Noted    Celecoxib Itching 07/31/2024    Doxycycline GI Upset 07/31/2024    Meperidine Nausea/vomiting 12/29/2009    Sulfa (sulfonamide antibiotics) Hives 12/29/2009       (Not in a hospital admission)       Past Medical History:   Diagnosis Date    Hypertension        Past Surgical History:   Procedure Laterality Date    JOINT REPLACEMENT          reports that she has never smoked. She has never used smokeless tobacco. She reports that she does not drink alcohol and does not use drugs.    Review of Systems  Review of Systems                               Objective    Vitals:    12/11/24 1223   BP: 164/84   BP Location: Left arm   Pulse: 104   Resp: 16   Temp: 36.7 °C (98.1 °F)   SpO2: 98%   Weight: 69.4 kg (153 lb)   Height: 1.499 m (4' 11\")     No LMP recorded. Patient is postmenopausal.    Physical Exam  Vitals reviewed.   HENT:      Head: Normocephalic and atraumatic.      Nose: Nose normal.      Mouth/Throat:      Mouth: Mucous membranes are moist.   Eyes:      Extraocular Movements: Extraocular movements intact.      Conjunctiva/sclera: Conjunctivae normal.      Pupils: Pupils are equal, round, and reactive to light.   Cardiovascular:      Rate and Rhythm: Normal rate and regular rhythm.   Pulmonary:      Effort: Pulmonary effort is normal.      Breath sounds: Wheezing present.   Skin:     General: Skin is warm.   Neurological:      Mental Status: She is alert and oriented to person, place, and time.   Psychiatric:         Mood and Affect: Mood normal.         Behavior: Behavior normal.             Point of Care Test & Imaging Results from this visit  No results found for this visit on 12/11/24.   XR chest 2 views    Result Date: " 12/11/2024  Interpreted By:  Schoenberger, Joseph, STUDY: XR CHEST 2 VIEWS;  12/11/2024 12:46 pm   INDICATION: Signs/Symptoms:wheezing.   ,R06.2 Wheezing   COMPARISON: 09/27/2013   ACCESSION NUMBER(S): [id number]   ORDERING CLINICIAN: RYAN JUDGE   FINDINGS:         CARDIOMEDIASTINAL SILHOUETTE: The cardiac silhouette remains normal in size. In the interval since the prior exam there is a new retrocardiac opacity with a gas fluid level which is likely a moderate size hiatus hernia.   LUNGS: No definite new focal lung opacity. Hypoventilatory exam. Elevated right hemidiaphragm unchanged from prior.   ABDOMEN: No remarkable upper abdominal findings.   BONES: No acute osseous changes.       1.  No evidence of acute cardiopulmonary process. 2. Interval development of a moderate size hiatus hernia       MACRO: None   Signed by: Joseph Schoenberger 12/11/2024 12:48 PM Dictation workstation:   [id number]     Diagnostic study results (if any) were reviewed by Ryan Judge PA-C.    Assessment/Plan   Allergies, medications, history, and pertinent labs/EKGs/Imaging reviewed by Ryan Judge PA-C.     Medical Decision Making  -         Patient is educated about their diagnoses.     -          Discussed medications benefits and adverse effects.     -          Answered all patient’s questions.     -          Patient will call 911 or go to the nearest ED if worsen symptoms .     -          Patient is agreeable to the plan of care and is deemed stable upon discharge.     -          Follow up with your primary care provider in two days.      Orders and Diagnoses  Diagnoses and all orders for this visit:  Wheezing  -     XR chest 2 views; Future  -     ipratropium-albuteroL (Duo-Neb) 0.5-2.5 mg/3 mL nebulizer solution 3 mL  -     methylPREDNISolone (Medrol Dospak) 4 mg tablets; Follow schedule on package instructions  Hiatus hernia syndrome  -     Referral to General Surgery; Future  Lower respiratory tract infection  -      azithromycin (Zithromax) 250 mg tablet; Take 2 tabs (500 mg) by mouth today, then take 1 tab daily for 4 days.      Medical Admin Record  Administrations This Visit       ipratropium-albuteroL (Duo-Neb) 0.5-2.5 mg/3 mL nebulizer solution 3 mL       Admin Date  12/11/2024 Action  Given Dose  3 mL Route  nebulization Documented By  Sandra Garrett MA                    Patient disposition: Home    Electronically signed by Ryan Judge PA-C  1:11 PM

## 2024-12-12 PROBLEM — R51.9 FRONTAL HEADACHE: Status: ACTIVE | Noted: 2024-05-14

## 2024-12-12 PROBLEM — K21.9 CHRONIC GERD: Status: ACTIVE | Noted: 2024-12-12

## 2024-12-12 PROBLEM — M76.32 ILIOTIBIAL BAND SYNDROME AFFECTING LEFT LOWER LEG: Status: ACTIVE | Noted: 2023-05-22

## 2024-12-12 PROBLEM — F32.A DEPRESSION: Status: ACTIVE | Noted: 2024-05-14

## 2024-12-12 PROBLEM — N81.9 VAGINAL VAULT PROLAPSE: Status: ACTIVE | Noted: 2024-12-12

## 2024-12-12 PROBLEM — Z98.890 HISTORY OF ARTHROPLASTY OF RIGHT HIP: Status: ACTIVE | Noted: 2024-12-12

## 2024-12-12 PROBLEM — R47.89 SPEECH DYSFLUENCY: Status: ACTIVE | Noted: 2024-09-19

## 2024-12-12 PROBLEM — R53.83 FATIGUE: Status: ACTIVE | Noted: 2024-05-14

## 2024-12-12 PROBLEM — N81.6 HERNIATION OF RECTUM INTO VAGINA: Status: ACTIVE | Noted: 2024-12-12

## 2024-12-12 PROBLEM — Z96.659 ARTIFICIAL KNEE JOINT PRESENT: Status: ACTIVE | Noted: 2024-12-12

## 2024-12-12 PROBLEM — S80.02XA CONTUSION OF LEFT KNEE: Status: ACTIVE | Noted: 2024-12-12

## 2024-12-12 PROBLEM — E11.42 TYPE 2 DIABETES MELLITUS WITH DIABETIC POLYNEUROPATHY: Status: ACTIVE | Noted: 2024-12-12

## 2024-12-12 PROBLEM — E66.811 OBESITY, CLASS I, BMI 30.0-34.9 (SEE ACTUAL BMI): Status: ACTIVE | Noted: 2024-12-12

## 2024-12-12 PROBLEM — S22.39XA RIB FRACTURE: Status: ACTIVE | Noted: 2024-12-12

## 2024-12-12 PROBLEM — G43.909 MIGRAINE: Status: ACTIVE | Noted: 2024-09-19

## 2024-12-12 PROBLEM — E78.5 DYSLIPIDEMIA: Status: ACTIVE | Noted: 2024-09-19

## 2024-12-12 PROBLEM — M16.10 PRIMARY LOCALIZED OSTEOARTHRITIS OF PELVIC REGION AND THIGH: Status: ACTIVE | Noted: 2024-12-12

## 2024-12-12 PROBLEM — G45.9 TIA (TRANSIENT ISCHEMIC ATTACK): Status: ACTIVE | Noted: 2024-09-19

## 2024-12-12 PROBLEM — R25.1 TREMOR: Status: ACTIVE | Noted: 2024-05-14

## 2024-12-12 PROBLEM — F41.9 ANXIETY: Status: ACTIVE | Noted: 2024-05-14

## 2024-12-12 PROBLEM — L30.9 DERMATITIS: Status: ACTIVE | Noted: 2024-12-12
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
## 2024-12-12 PROBLEM — Z90.710 H/O HYSTERECTOMY FOR BENIGN DISEASE: Status: ACTIVE | Noted: 2024-12-12

## 2024-12-12 PROBLEM — K44.9 HIATAL HERNIA: Status: ACTIVE | Noted: 2024-12-12

## 2024-12-12 PROBLEM — I49.9 CARDIAC ARRHYTHMIA: Status: ACTIVE | Noted: 2024-09-19

## 2024-12-12 PROBLEM — K46.9 ENTEROCELE: Status: ACTIVE | Noted: 2024-12-12

## 2024-12-12 PROBLEM — M67.441 GANGLION CYST OF TENDON SHEATH OF RIGHT HAND: Status: ACTIVE | Noted: 2024-05-14

## 2024-12-12 PROBLEM — E66.9 OBESITY: Status: ACTIVE | Noted: 2024-12-12

## 2024-12-12 PROBLEM — N39.3 URINARY, INCONTINENCE, STRESS FEMALE: Status: ACTIVE | Noted: 2024-12-12

## 2024-12-12 NOTE — PATIENT INSTRUCTIONS
You were seen today for a hiatal hernia   Please follow up with any testing / labs / referrals as instructed   If you have questions please call our office at [phone]

## 2024-12-12 NOTE — PROGRESS NOTES
GENERAL SURGERY/TRAUMA  HISTORY AND PHYSICAL/CONSULT    Date: 12/13/2024 Time: 10:53 AM    Name: Josse Montero  MRN: [id number]    This is a 79 y.o. female with a hiatal hernia found incidentally on chest xray when patient was seen in urgent care for a cough/wheezing. Patient has a history of chronic GERD for which she takes Pantoprazole daily.  Denies epigastric pain, nausea, vomiting, or regurgitation.     2 = Symptoms noticeable and bothersome but not every day     Chest Xray 12/11/24:   INDICATION:  Signs/Symptoms:wheezing.      R06.2 Wheezing      COMPARISON:  09/27/2013      ACCESSION NUMBER(S):  [id number]      ORDERING CLINICIAN:  SORAYA ALANIZ      FINDINGS:                  CARDIOMEDIASTINAL SILHOUETTE:  The cardiac silhouette remains normal in size. In the interval since  the prior exam there is a new retrocardiac opacity with a gas fluid  level which is likely a moderate size hiatus hernia.      LUNGS:  No definite new focal lung opacity. Hypoventilatory exam. Elevated  right hemidiaphragm unchanged from prior.      ABDOMEN:  No remarkable upper abdominal findings.      BONES:  No acute osseous changes.      IMPRESSION:  1.  No evidence of acute cardiopulmonary process.  2. Interval development of a moderate size hiatus hernia      PAST MEDICAL HISTORY:  Patient Active Problem List   Diagnosis    Anxiety    Artificial knee joint present    Cardiac arrhythmia    Cholecystitis, unspecified    Chronic GERD    Contusion of left knee    Depression    Dermatitis    Dyslipidemia    Enterocele    Herniation of rectum into vagina    Fall at home, initial encounter    Fatigue    Frontal headache    Ganglion cyst of tendon sheath of right hand    H/O hysterectomy for benign disease    History of arthroplasty of right hip    HLD (hyperlipidemia)    Hypertension    Hypokalemia    Iliotibial band syndrome affecting left lower leg    Migraine    Obesity    Primary localized osteoarthritis of pelvic region and thigh     Rib fracture    Speech dysfluency    TIA (transient ischemic attack)    Tremor    Type 2 diabetes mellitus with diabetic polyneuropathy    Urinary, incontinence, stress female    Vaginal vault prolapse    Obesity, Class I, BMI 30.0-34.9 (see actual BMI)    Hiatal hernia      Past Medical History:   Diagnosis Date    Hypertension         PAST SURGICAL HISTORY:  Past Surgical History:   Procedure Laterality Date    JOINT REPLACEMENT         FAMILY HISTORY:  Family History   Problem Relation Name Age of Onset    Cancer Mother          SOCIAL HISTORY:  Social History     Tobacco Use    Smoking status: Never    Smokeless tobacco: Never   Vaping Use    Vaping status: Never Used   Substance Use Topics    Alcohol use: Never    Drug use: Never       MEDICATIONS:  Prior to Admission Medications:    Current Outpatient Medications:     azithromycin (Zithromax) 250 mg tablet, Take 2 tabs (500 mg) by mouth today, then take 1 tab daily for 4 days., Disp: 6 tablet, Rfl: 0    busPIRone (Buspar) 15 mg tablet, Take 1 tablet (15 mg) by mouth 2 times a day., Disp: , Rfl:     butalbital-acetaminophen-caff -40 mg tablet, Take 1 tablet by mouth every 4 hours if needed for headaches., Disp: , Rfl:     clopidogrel (Plavix) 75 mg tablet, Take 1 tablet (75 mg) by mouth once daily., Disp: , Rfl:     dilTIAZem LA (Cardizem LA) 120 mg 24 hr tablet, Take 1 tablet (120 mg) by mouth once daily., Disp: , Rfl:     fexofenadine (Allegra) 180 mg tablet, Take 1 tablet (180 mg) by mouth once daily., Disp: , Rfl:     LORazepam (Ativan) 0.5 mg tablet, Take 0.5 tablets (0.25 mg) by mouth once daily as needed for anxiety., Disp: , Rfl:     methylPREDNISolone (Medrol Dospak) 4 mg tablets, Follow schedule on package instructions, Disp: 21 tablet, Rfl: 0    omeprazole (PriLOSEC) 40 mg DR capsule, Take 1 capsule (40 mg) by mouth once daily., Disp: , Rfl:     pantoprazole (ProtoNix) 20 mg EC tablet, Take 1 tablet (20 mg) by mouth once daily in the morning.  Take before meals., Disp: , Rfl:     rosuvastatin (Crestor) 20 mg tablet, Take 1 tablet (20 mg) by mouth once daily at bedtime., Disp: 30 tablet, Rfl: 1    sertraline (Zoloft) 25 mg tablet, Take 1 tablet (25 mg) by mouth once daily., Disp: , Rfl:     ubrogepant (Ubrelvy) 100 mg tablet tablet, Take 1 tablet (100 mg) by mouth if needed (migraines) for up to 30 doses., Disp: 10 tablet, Rfl: 11    ALLERGIES:  Allergies   Allergen Reactions    Celecoxib Itching    Doxycycline GI Upset    Meperidine Nausea/vomiting    Sulfa (Sulfonamide Antibiotics) Hives and Itching       REVIEW OF SYSTEMS:  GENERAL: Negative for malaise, significant weight loss and fever  NECK: Negative for lumps, goiter, pain and significant neck swelling  RESPIRATORY: Positive for cough, wheezing or shortness of breath.  CARDIOVASCULAR: Negative for chest pain, leg swelling or palpitations.  GI: Negative for abdominal discomfort, blood in stools or black stools or change in bowel habits, positive heartburn, reflux and occasional regurgitation  : No history of dysuria, frequency or incontinence  MUSCULOSKELETAL: Negative for joint pain or swelling, back pain or muscle pain.  SKIN: Negative for lesions, rash, and itching.  PSYCH: Negative for sleep disturbance, mood disorder and recent psychosocial stressors.  ENDOCRINE: Negative for cold or heat intolerance, polyuria, polydipsia and goiter.    PHYSICAL EXAM:  There were no vitals taken for this visit.  General appearance: No abnormality detected  Skin: Small bruises  Lungs: Mild wheezing  Heart: Regular  Abdomen: Soft, nondistended, nontender  Extremities: Warm, no gross edema    IMPRESSION:  Josse Montero is a 79 y.o. female with a hiatal hernia found incidentally on chest xray. Patient has a history of chronic GERD for which she takes Pantoprazole daily.  Denies epigastric pain, nausea, vomiting, or regurgitation.     History and chest x-ray is consistent with hiatal hernia with reflux and  regurgitation issues, recent episode of TIA.  Chronic cough for the last few weeks which may be related to aspiration or isolated pulmonary pathology.    We discussed the paraesophageal hiatal hernia, symptoms it causes and the surgical solutions.  Overall if she was in good health an ideal candidate and willing to undergo surgery I would recommend a paraesophageal hiatal hernia repair with possible fundoplication.    Although she is very pleasant and understanding however she seems to be not keen on getting any surgical intervention and procedures unless mandatory.    PLAN:    Will get CT scan of chest without contrast.  She will need an endoscopy also eventually which she seems reluctant right now.    For any invasive procedures we can wait for at least 6 months from her TIA episode and then if she is interested we can proceed with endoscopy and planning for surgical repair.    Chances of needing emergent surgery are fairly low and I educated her on coming to emergency room if she is having severe abdominal or chest pain associated with vomiting or if she is not able to keep her food down etc.    Meanwhile she will continue PPI and medical care by her team.      Walter Carlos MD  Bariatric and Minimally Invasive General Surgery

## 2024-12-13 ENCOUNTER — OFFICE VISIT (OUTPATIENT)
Dept: SURGERY | Facility: CLINIC | Age: 79
End: 2024-12-13
Payer: COMMERCIAL

## 2024-12-13 VITALS
HEIGHT: 58 IN | OXYGEN SATURATION: 96 % | WEIGHT: 151.8 LBS | BODY MASS INDEX: 31.87 KG/M2 | DIASTOLIC BLOOD PRESSURE: 86 MMHG | SYSTOLIC BLOOD PRESSURE: 146 MMHG | HEART RATE: 109 BPM

## 2024-12-13 DIAGNOSIS — K44.9 HIATUS HERNIA SYNDROME: ICD-10-CM

## 2024-12-13 DIAGNOSIS — K44.9 HIATAL HERNIA: Primary | ICD-10-CM

## 2024-12-13 DIAGNOSIS — K21.9 CHRONIC GERD: ICD-10-CM

## 2024-12-13 DIAGNOSIS — E66.811 OBESITY, CLASS I, BMI 30.0-34.9 (SEE ACTUAL BMI): ICD-10-CM

## 2024-12-13 PROCEDURE — 99203 OFFICE O/P NEW LOW 30 MIN: CPT | Performed by: SURGERY

## 2024-12-13 PROCEDURE — 1157F ADVNC CARE PLAN IN RCRD: CPT | Performed by: SURGERY

## 2024-12-13 PROCEDURE — 3079F DIAST BP 80-89 MM HG: CPT | Performed by: SURGERY

## 2024-12-13 PROCEDURE — 99213 OFFICE O/P EST LOW 20 MIN: CPT | Performed by: SURGERY

## 2024-12-13 PROCEDURE — 3077F SYST BP >= 140 MM HG: CPT | Performed by: SURGERY

## 2024-12-13 PROCEDURE — 1159F MED LIST DOCD IN RCRD: CPT | Performed by: SURGERY

## 2024-12-13 PROCEDURE — 1036F TOBACCO NON-USER: CPT | Performed by: SURGERY

## 2025-01-03 ENCOUNTER — APPOINTMENT (OUTPATIENT)
Dept: CARDIOLOGY | Facility: HOSPITAL | Age: 80
End: 2025-01-03
Payer: COMMERCIAL

## 2025-01-03 ENCOUNTER — APPOINTMENT (OUTPATIENT)
Dept: RADIOLOGY | Facility: HOSPITAL | Age: 80
End: 2025-01-03
Payer: COMMERCIAL

## 2025-01-03 ENCOUNTER — HOSPITAL ENCOUNTER (EMERGENCY)
Facility: HOSPITAL | Age: 80
Discharge: HOME | End: 2025-01-04
Attending: STUDENT IN AN ORGANIZED HEALTH CARE EDUCATION/TRAINING PROGRAM
Payer: COMMERCIAL

## 2025-01-03 VITALS
RESPIRATION RATE: 20 BRPM | BODY MASS INDEX: 30.24 KG/M2 | TEMPERATURE: 96.8 F | SYSTOLIC BLOOD PRESSURE: 171 MMHG | WEIGHT: 150 LBS | DIASTOLIC BLOOD PRESSURE: 77 MMHG | HEART RATE: 81 BPM | HEIGHT: 59 IN | OXYGEN SATURATION: 92 %

## 2025-01-03 DIAGNOSIS — R00.0 TACHYCARDIA: ICD-10-CM

## 2025-01-03 DIAGNOSIS — J20.9 SUBACUTE BRONCHITIS: Primary | ICD-10-CM

## 2025-01-03 DIAGNOSIS — R05.9 COUGH, UNSPECIFIED TYPE: ICD-10-CM

## 2025-01-03 LAB
ALBUMIN SERPL BCP-MCNC: 4.4 G/DL (ref 3.4–5)
ALP SERPL-CCNC: 91 U/L (ref 33–136)
ALT SERPL W P-5'-P-CCNC: 15 U/L (ref 7–45)
ANION GAP SERPL CALC-SCNC: 15 MMOL/L (ref 10–20)
AST SERPL W P-5'-P-CCNC: 20 U/L (ref 9–39)
BASOPHILS # BLD AUTO: 0.05 X10*3/UL (ref 0–0.1)
BASOPHILS NFR BLD AUTO: 0.4 %
BILIRUB SERPL-MCNC: 0.5 MG/DL (ref 0–1.2)
BNP SERPL-MCNC: 83 PG/ML (ref 0–99)
BUN SERPL-MCNC: 13 MG/DL (ref 6–23)
CALCIUM SERPL-MCNC: 9.3 MG/DL (ref 8.6–10.3)
CARDIAC TROPONIN I PNL SERPL HS: 18 NG/L (ref 0–13)
CARDIAC TROPONIN I PNL SERPL HS: 21 NG/L (ref 0–13)
CHLORIDE SERPL-SCNC: 104 MMOL/L (ref 98–107)
CO2 SERPL-SCNC: 27 MMOL/L (ref 21–32)
CREAT SERPL-MCNC: 0.98 MG/DL (ref 0.5–1.05)
D DIMER PPP FEU-MCNC: 683 NG/ML FEU
EGFRCR SERPLBLD CKD-EPI 2021: 59 ML/MIN/1.73M*2
EOSINOPHIL # BLD AUTO: 0.03 X10*3/UL (ref 0–0.4)
EOSINOPHIL NFR BLD AUTO: 0.3 %
ERYTHROCYTE [DISTWIDTH] IN BLOOD BY AUTOMATED COUNT: 14.4 % (ref 11.5–14.5)
FLUAV RNA RESP QL NAA+PROBE: NOT DETECTED
FLUBV RNA RESP QL NAA+PROBE: NOT DETECTED
GLUCOSE SERPL-MCNC: 167 MG/DL (ref 74–99)
HCT VFR BLD AUTO: 43.3 % (ref 36–46)
HGB BLD-MCNC: 13.8 G/DL (ref 12–16)
IMM GRANULOCYTES # BLD AUTO: 0.04 X10*3/UL (ref 0–0.5)
IMM GRANULOCYTES NFR BLD AUTO: 0.4 % (ref 0–0.9)
LYMPHOCYTES # BLD AUTO: 2.17 X10*3/UL (ref 0.8–3)
LYMPHOCYTES NFR BLD AUTO: 19.4 %
MAGNESIUM SERPL-MCNC: 1.55 MG/DL (ref 1.6–2.4)
MCH RBC QN AUTO: 28.6 PG (ref 26–34)
MCHC RBC AUTO-ENTMCNC: 31.9 G/DL (ref 32–36)
MCV RBC AUTO: 90 FL (ref 80–100)
MONOCYTES # BLD AUTO: 0.77 X10*3/UL (ref 0.05–0.8)
MONOCYTES NFR BLD AUTO: 6.9 %
NEUTROPHILS # BLD AUTO: 8.13 X10*3/UL (ref 1.6–5.5)
NEUTROPHILS NFR BLD AUTO: 72.6 %
NRBC BLD-RTO: 0 /100 WBCS (ref 0–0)
PLATELET # BLD AUTO: 235 X10*3/UL (ref 150–450)
POTASSIUM SERPL-SCNC: 3.7 MMOL/L (ref 3.5–5.3)
PROT SERPL-MCNC: 7.3 G/DL (ref 6.4–8.2)
RBC # BLD AUTO: 4.83 X10*6/UL (ref 4–5.2)
SARS-COV-2 RNA RESP QL NAA+PROBE: NOT DETECTED
SODIUM SERPL-SCNC: 142 MMOL/L (ref 136–145)
WBC # BLD AUTO: 11.2 X10*3/UL (ref 4.4–11.3)

## 2025-01-03 PROCEDURE — 84484 ASSAY OF TROPONIN QUANT: CPT | Performed by: STUDENT IN AN ORGANIZED HEALTH CARE EDUCATION/TRAINING PROGRAM

## 2025-01-03 PROCEDURE — 2550000001 HC RX 255 CONTRASTS: Performed by: STUDENT IN AN ORGANIZED HEALTH CARE EDUCATION/TRAINING PROGRAM

## 2025-01-03 PROCEDURE — 85025 COMPLETE CBC W/AUTO DIFF WBC: CPT | Performed by: NURSE PRACTITIONER

## 2025-01-03 PROCEDURE — 83880 ASSAY OF NATRIURETIC PEPTIDE: CPT | Performed by: NURSE PRACTITIONER

## 2025-01-03 PROCEDURE — 71046 X-RAY EXAM CHEST 2 VIEWS: CPT | Mod: FOREIGN READ | Performed by: RADIOLOGY

## 2025-01-03 PROCEDURE — 85379 FIBRIN DEGRADATION QUANT: CPT | Performed by: NURSE PRACTITIONER

## 2025-01-03 PROCEDURE — 83735 ASSAY OF MAGNESIUM: CPT | Performed by: NURSE PRACTITIONER

## 2025-01-03 PROCEDURE — 71275 CT ANGIOGRAPHY CHEST: CPT

## 2025-01-03 PROCEDURE — 84484 ASSAY OF TROPONIN QUANT: CPT | Performed by: NURSE PRACTITIONER

## 2025-01-03 PROCEDURE — 71275 CT ANGIOGRAPHY CHEST: CPT | Performed by: RADIOLOGY

## 2025-01-03 PROCEDURE — 93005 ELECTROCARDIOGRAM TRACING: CPT

## 2025-01-03 PROCEDURE — 99285 EMERGENCY DEPT VISIT HI MDM: CPT | Mod: 25 | Performed by: STUDENT IN AN ORGANIZED HEALTH CARE EDUCATION/TRAINING PROGRAM

## 2025-01-03 PROCEDURE — 2500000004 HC RX 250 GENERAL PHARMACY W/ HCPCS (ALT 636 FOR OP/ED)

## 2025-01-03 PROCEDURE — 80053 COMPREHEN METABOLIC PANEL: CPT | Performed by: NURSE PRACTITIONER

## 2025-01-03 PROCEDURE — 36415 COLL VENOUS BLD VENIPUNCTURE: CPT | Performed by: NURSE PRACTITIONER

## 2025-01-03 PROCEDURE — 87636 SARSCOV2 & INF A&B AMP PRB: CPT | Performed by: NURSE PRACTITIONER

## 2025-01-03 PROCEDURE — 71046 X-RAY EXAM CHEST 2 VIEWS: CPT

## 2025-01-03 RX ORDER — LANOLIN ALCOHOL/MO/W.PET/CERES
400 CREAM (GRAM) TOPICAL DAILY
Status: DISCONTINUED | OUTPATIENT
Start: 2025-01-03 | End: 2025-01-04 | Stop reason: HOSPADM

## 2025-01-03 RX ORDER — LEVOFLOXACIN 250 MG/1
250 TABLET ORAL DAILY
Qty: 10 TABLET | Refills: 0 | Status: SHIPPED | OUTPATIENT
Start: 2025-01-03 | End: 2025-01-13

## 2025-01-03 RX ADMIN — MAGNESIUM OXIDE TAB 400 MG (241.3 MG ELEMENTAL MG) 400 MG: 400 (241.3 MG) TAB at 21:49

## 2025-01-03 RX ADMIN — SODIUM CHLORIDE 1000 ML: 9 INJECTION, SOLUTION INTRAVENOUS at 21:49

## 2025-01-03 RX ADMIN — IOHEXOL 75 ML: 350 INJECTION, SOLUTION INTRAVENOUS at 22:38

## 2025-01-03 ASSESSMENT — COLUMBIA-SUICIDE SEVERITY RATING SCALE - C-SSRS
1. IN THE PAST MONTH, HAVE YOU WISHED YOU WERE DEAD OR WISHED YOU COULD GO TO SLEEP AND NOT WAKE UP?: NO
2. HAVE YOU ACTUALLY HAD ANY THOUGHTS OF KILLING YOURSELF?: NO
6. HAVE YOU EVER DONE ANYTHING, STARTED TO DO ANYTHING, OR PREPARED TO DO ANYTHING TO END YOUR LIFE?: NO

## 2025-01-03 ASSESSMENT — PAIN SCALES - GENERAL: PAINLEVEL_OUTOF10: 0 - NO PAIN

## 2025-01-03 ASSESSMENT — PAIN - FUNCTIONAL ASSESSMENT: PAIN_FUNCTIONAL_ASSESSMENT: 0-10

## 2025-01-03 NOTE — ED TRIAGE NOTES
PT.  HAS BEEN HAVING COUGH FOR ABOUT A MONTH, STATES COUGHING UP STICKY, YELLOW PHLEGM. DENIES FEVERS. PT.  WAS TOLD THIS MORNING THAT HER HEART WAS BEATING TOO FAST.

## 2025-01-03 NOTE — ED TRIAGE NOTES
Secondary to patient volumes and overcrowding, I performed a brief medical screening exam of the patient in triage, as the patient awaits space in the main ED.    History of Present Illness:  Josse Montero presents with   Chief Complaint   Patient presents with    Cough     PT.  HAS BEEN HAVING COUGH FOR ABOUT A MONTH, STATES COUGHING UP STICKY, YELLOW PHLEGM. DENIES FEVERS. PT.  WAS TOLD THIS MORNING THAT HER HEART WAS BEATING TOO FAST.        Physical Exam:  General - In no acute distress  Respiratory - Breathing comfortably  Cardiac - Normal S1, S2, no m/g/r  Neurologic - Moving all extremities    Medical Decision Making:  Patient will require further evaluation in the main ED.    Initial diagnostic tests were ordered from triage.      The patient demonstrates understanding that this initial evaluation is a brief medical screening exam and the expectation is that they await for space in the main ED to be further evaluated.  The patient understands that, if they leave prior to further evaluation in the main ED after this initial evaluation in triage, they are doing so under their own accord knowing that their evaluation/work-up is not yet complete. The patient also understands that any preliminary diagnostic results, including abnormalities, may not be shared with them, if they choose to leave prior to further evaluation in the main ED.

## 2025-01-04 NOTE — DISCHARGE INSTRUCTIONS
You were prescribed levofloxacin, advised to start this medication as soon as possible.  Do not start the ciprofloxacin previously prescribed by your PCP.  Follow-up with your PCP as soon as possible.  Return to the ED with any new or worsening symptoms including increasing shortness of breath, chest pain, fever, chills.  Continue to rest and hydrate.

## 2025-01-04 NOTE — ED PROVIDER NOTES
HPI   Chief Complaint   Patient presents with    Cough     PT.  HAS BEEN HAVING COUGH FOR ABOUT A MONTH, STATES COUGHING UP STICKY, YELLOW PHLEGM. DENIES FEVERS. PT.  WAS TOLD THIS MORNING THAT HER HEART WAS BEATING TOO FAST.        70-year-old female with a history of hypertension, hyperlipidemia, anxiety/depression, TIA presenting to the ED due to persistent productive cough.  Patient states this has been ongoing for over a month but has not gotten better or worse.  Patient was previously seen on 12/11 at an urgent care and given a prescription for Medrol Dosepak as well as azithromycin with no improvement.  Patient was seen earlier today by her PCP who instructed her to get evaluated in the ED due to tachycardia in the presence of her symptoms. Reports she is producing thick yellow/green mucus with her cough, nasal congestion, occasional chills.  Denies any known fevers, sore throat, HA, vision changes, SOB, pleuritic chest pain, chest heaviness, palpitations, abdominal pain, N/V/D/C, urinary symptoms, new or worsening leg swelling.  Denies any history of asthma, COPD, CHF, DVT/PE.  Denies any smoking history, recent travel, surgeries/hospitalizations, history of cancer, exogenous steroid use.             Patient History   Past Medical History:   Diagnosis Date    Hypertension     Stroke (Multi)      Past Surgical History:   Procedure Laterality Date    APPENDECTOMY      BLADDER SURGERY      CHOLECYSTECTOMY      JOINT REPLACEMENT      TONSILLECTOMY      TOTAL HIP ARTHROPLASTY Right     TOTAL KNEE ARTHROPLASTY Left     TUBAL LIGATION       Family History   Problem Relation Name Age of Onset    Cancer Mother       Social History     Tobacco Use    Smoking status: Never    Smokeless tobacco: Never   Vaping Use    Vaping status: Never Used   Substance Use Topics    Alcohol use: Never    Drug use: Never       Physical Exam   ED Triage Vitals [01/03/25 1520]   Temperature Heart Rate Respirations BP   36 °C  (96.8 °F) (!) 107 16 134/77      Pulse Ox Temp src Heart Rate Source Patient Position   (!) 93 % -- Monitor Sitting      BP Location FiO2 (%)     Right arm --       Physical Exam  Constitutional:       General: She is not in acute distress.     Appearance: She is ill-appearing. She is not toxic-appearing.   HENT:      Head: Normocephalic and atraumatic.      Nose: Congestion and rhinorrhea present.      Mouth/Throat:      Mouth: Mucous membranes are moist.      Pharynx: Oropharynx is clear.   Eyes:      Extraocular Movements: Extraocular movements intact.      Conjunctiva/sclera: Conjunctivae normal.      Pupils: Pupils are equal, round, and reactive to light.   Cardiovascular:      Rate and Rhythm: Regular rhythm. Tachycardia present.      Heart sounds: Normal heart sounds.   Pulmonary:      Effort: Pulmonary effort is normal. No respiratory distress.      Breath sounds: Normal breath sounds.   Chest:      Chest wall: No tenderness.   Abdominal:      General: Abdomen is flat. Bowel sounds are normal. There is no distension.      Palpations: Abdomen is soft.      Tenderness: There is no abdominal tenderness. There is no guarding or rebound.   Musculoskeletal:      Cervical back: Normal range of motion and neck supple. No rigidity or tenderness.   Skin:     General: Skin is warm and dry.   Neurological:      General: No focal deficit present.      Mental Status: She is alert.         ED Course & MDM   Diagnoses as of 01/03/25 2357   Subacute bronchitis   Cough, unspecified type   Tachycardia                 No data recorded     [place] Coma Scale Score: 15 (01/03/25 1519 : Desiree Bishop RN)                           Medical Decision Making  70-year-old female with a history of hypertension, hyperlipidemia, anxiety/depression, TIA presenting to the ED due to persistent productive cough. Upon arrival to the ED, patient was tachycardic up to 107 with SpO2 93% on RA.  Initial workup was ordered in triage.  Patient was  referred by her PCP earlier today due to tachycardia in the presence of her symptoms.    On exam, patient is ill-appearing but not in any acute distress.  Patient presents with a raspy cough but lungs are clear to auscultation, no increased work of breathing, or retractions.  Reassessment of vitals, patient is tachycardic at 109 and SpO2 improved to 94% on RA.  Flu A/B, COVID negative. CBC and CMP relatively unremarkable, eGFR 59.  Magnesium decreased at 1.55, replenished while in the ED.  BNP WNL 83. Initial high-sensitivity troponin 21.  EKG showing sinus tachycardia, rate 106, , QRS 86, QTc 470. No acute axis deviation, STEMI, or ischemia.  D-dimer slightly elevated at 683.  No reported SOB or pleuritic chest pain.  No history of DVT/PE, smoking history, recent travel, recent surgeries/hospitalizations, history of cancer, exogenous steroid use.  CXR showing no acute cardiopulmonary disease, preexisting hyperinflation present.  CTA added to workup.  Patient received 1 L NS bolus. Repeat high-sensitivity troponin 18.  After receiving fluids, patient's tachycardia resolved, HR 81. CTA showing no acute PE and bibasilar bronchial wall thickening and mucus plugging which could be infectious or inflammatory.  Due to history and physical with CTA findings, cannot rule out an atypical pneumonia vs subacute bronchitis.  Patient treated with levofloxacin and instructed for close follow-up with her PCP which she already has scheduled in 2 days.  Patient and her son were advised on all lab and imaging findings, all questions and concerns were answered. Patient is agreeable to plan of care and discharge.         Procedure  Procedures     Adore Guadalupe PA-C  01/04/25 0012

## 2025-01-07 ENCOUNTER — HOSPITAL ENCOUNTER (OUTPATIENT)
Dept: RADIOLOGY | Facility: CLINIC | Age: 80
Discharge: HOME | End: 2025-01-07
Payer: MEDICARE

## 2025-01-07 DIAGNOSIS — K21.9 CHRONIC GERD: ICD-10-CM

## 2025-01-07 DIAGNOSIS — K44.9 HIATAL HERNIA: ICD-10-CM

## 2025-01-07 DIAGNOSIS — E66.811 OBESITY, CLASS I, BMI 30.0-34.9 (SEE ACTUAL BMI): ICD-10-CM

## 2025-01-07 PROCEDURE — 71250 CT THORAX DX C-: CPT | Performed by: RADIOLOGY

## 2025-01-07 PROCEDURE — 71250 CT THORAX DX C-: CPT

## 2025-01-12 LAB
ATRIAL RATE: 106 BPM
P AXIS: 46 DEGREES
P OFFSET: 191 MS
P ONSET: 131 MS
PR INTERVAL: 178 MS
Q ONSET: 220 MS
QRS COUNT: 18 BEATS
QRS DURATION: 86 MS
QT INTERVAL: 354 MS
QTC CALCULATION(BAZETT): 470 MS
QTC FREDERICIA: 427 MS
R AXIS: 5 DEGREES
T AXIS: 43 DEGREES
T OFFSET: 397 MS
VENTRICULAR RATE: 106 BPM

## 2025-01-13 ENCOUNTER — TELEPHONE (OUTPATIENT)
Dept: SURGERY | Facility: CLINIC | Age: 80
End: 2025-01-13
Payer: MEDICARE

## 2025-07-01 NOTE — TELEPHONE ENCOUNTER
"Patient called in leaving a message over the weekend requesting CT scan results be explained.  Reviewed with Dr. Cain and Dr. Carlos who saw, to be expected, a moderately sized hiatal hernia.  Explained to patient that this was the expected finding based on other testing.  Reviewed again, that at the present moment this is not an emergency, but reviewed what would constitute an emergency.  Recommendation is that patient be cleared by neurology and off OAC to proceed with EGD and eventually surgery if the patient wishes. Explained that EGD is important to evaluate the esophagus to check for any cellular changes secondary to the acid coming up and \"burning\" the area.  Patient verbalized understanding.  She will follow up with neurology in March and relay our recommendations and keep our office posted on what neurology says.   " No